# Patient Record
Sex: FEMALE | Race: WHITE | NOT HISPANIC OR LATINO
[De-identification: names, ages, dates, MRNs, and addresses within clinical notes are randomized per-mention and may not be internally consistent; named-entity substitution may affect disease eponyms.]

---

## 2017-02-24 ENCOUNTER — FORM ENCOUNTER (OUTPATIENT)
Age: 57
End: 2017-02-24

## 2017-02-24 ENCOUNTER — APPOINTMENT (OUTPATIENT)
Dept: INTERNAL MEDICINE | Facility: CLINIC | Age: 57
End: 2017-02-24

## 2017-02-24 ENCOUNTER — NON-APPOINTMENT (OUTPATIENT)
Age: 57
End: 2017-02-24

## 2017-02-24 VITALS — SYSTOLIC BLOOD PRESSURE: 120 MMHG | DIASTOLIC BLOOD PRESSURE: 70 MMHG

## 2017-02-24 VITALS — HEIGHT: 64.75 IN | BODY MASS INDEX: 27.66 KG/M2 | WEIGHT: 164 LBS

## 2017-02-24 LAB
BILIRUB UR QL STRIP: NORMAL
CLARITY UR: CLEAR
COLLECTION METHOD: NORMAL
GLUCOSE UR-MCNC: NORMAL
HCG UR QL: 0.2 EU/DL
HGB UR QL STRIP.AUTO: NORMAL
KETONES UR-MCNC: NORMAL
LEUKOCYTE ESTERASE UR QL STRIP: NORMAL
NITRITE UR QL STRIP: NORMAL
PH UR STRIP: 5.5
PROT UR STRIP-MCNC: NORMAL
SP GR UR STRIP: 1.02

## 2017-02-25 ENCOUNTER — OUTPATIENT (OUTPATIENT)
Dept: OUTPATIENT SERVICES | Facility: HOSPITAL | Age: 57
LOS: 1 days | End: 2017-02-25
Payer: COMMERCIAL

## 2017-02-25 ENCOUNTER — APPOINTMENT (OUTPATIENT)
Dept: RADIOLOGY | Facility: IMAGING CENTER | Age: 57
End: 2017-02-25

## 2017-02-25 DIAGNOSIS — Z00.8 ENCOUNTER FOR OTHER GENERAL EXAMINATION: ICD-10-CM

## 2017-02-25 PROCEDURE — 71046 X-RAY EXAM CHEST 2 VIEWS: CPT

## 2017-02-27 LAB — BACTERIA UR CULT: ABNORMAL

## 2017-03-03 ENCOUNTER — LABORATORY RESULT (OUTPATIENT)
Age: 57
End: 2017-03-03

## 2017-03-03 ENCOUNTER — APPOINTMENT (OUTPATIENT)
Dept: INTERNAL MEDICINE | Facility: CLINIC | Age: 57
End: 2017-03-03

## 2017-03-03 VITALS — TEMPERATURE: 97.6 F

## 2017-03-03 VITALS — SYSTOLIC BLOOD PRESSURE: 120 MMHG | DIASTOLIC BLOOD PRESSURE: 70 MMHG

## 2017-03-03 LAB
BILIRUB UR QL STRIP: NEGATIVE
CLARITY UR: CLEAR
COLLECTION METHOD: NORMAL
GLUCOSE UR-MCNC: NEGATIVE
HCG UR QL: 0.2 EU/DL
HGB UR QL STRIP.AUTO: NORMAL
KETONES UR-MCNC: NEGATIVE
LEUKOCYTE ESTERASE UR QL STRIP: NORMAL
NITRITE UR QL STRIP: NEGATIVE
PH UR STRIP: 8.5
PROT UR STRIP-MCNC: NEGATIVE
SP GR UR STRIP: 1.02

## 2017-03-04 LAB
ALBUMIN SERPL ELPH-MCNC: 4 G/DL
ALP BLD-CCNC: 84 U/L
ALT SERPL-CCNC: 9 U/L
ANION GAP SERPL CALC-SCNC: 16 MMOL/L
AST SERPL-CCNC: 20 U/L
BASOPHILS # BLD AUTO: 0.02 K/UL
BASOPHILS NFR BLD AUTO: 0.3 %
BILIRUB SERPL-MCNC: 0.2 MG/DL
BUN SERPL-MCNC: 13 MG/DL
CALCIUM SERPL-MCNC: 9.8 MG/DL
CHLORIDE SERPL-SCNC: 104 MMOL/L
CHOLEST SERPL-MCNC: 166 MG/DL
CHOLEST/HDLC SERPL: 2.4 RATIO
CO2 SERPL-SCNC: 22 MMOL/L
CREAT SERPL-MCNC: 0.63 MG/DL
EOSINOPHIL # BLD AUTO: 0.11 K/UL
EOSINOPHIL NFR BLD AUTO: 1.7 %
GLUCOSE SERPL-MCNC: 82 MG/DL
HCT VFR BLD CALC: 39 %
HDLC SERPL-MCNC: 68 MG/DL
HGB BLD-MCNC: 12.6 G/DL
IMM GRANULOCYTES NFR BLD AUTO: 0.2 %
LDLC SERPL CALC-MCNC: 88 MG/DL
LYMPHOCYTES # BLD AUTO: 2.44 K/UL
LYMPHOCYTES NFR BLD AUTO: 37.8 %
MAN DIFF?: NORMAL
MCHC RBC-ENTMCNC: 31.3 PG
MCHC RBC-ENTMCNC: 32.3 GM/DL
MCV RBC AUTO: 97 FL
MONOCYTES # BLD AUTO: 0.46 K/UL
MONOCYTES NFR BLD AUTO: 7.1 %
NEUTROPHILS # BLD AUTO: 3.42 K/UL
NEUTROPHILS NFR BLD AUTO: 52.9 %
PLATELET # BLD AUTO: 282 K/UL
POTASSIUM SERPL-SCNC: 4.8 MMOL/L
PROT SERPL-MCNC: 6.9 G/DL
RBC # BLD: 4.02 M/UL
RBC # FLD: 13.9 %
SODIUM SERPL-SCNC: 142 MMOL/L
TRIGL SERPL-MCNC: 52 MG/DL
WBC # FLD AUTO: 6.46 K/UL

## 2017-03-23 ENCOUNTER — APPOINTMENT (OUTPATIENT)
Dept: UROLOGY | Facility: CLINIC | Age: 57
End: 2017-03-23

## 2017-03-23 VITALS
HEART RATE: 57 BPM | TEMPERATURE: 97.7 F | BODY MASS INDEX: 27.49 KG/M2 | HEIGHT: 65 IN | DIASTOLIC BLOOD PRESSURE: 79 MMHG | SYSTOLIC BLOOD PRESSURE: 121 MMHG | RESPIRATION RATE: 16 BRPM | WEIGHT: 165 LBS

## 2017-03-23 DIAGNOSIS — F17.200 NICOTINE DEPENDENCE, UNSPECIFIED, UNCOMPLICATED: ICD-10-CM

## 2017-03-24 ENCOUNTER — TRANSCRIPTION ENCOUNTER (OUTPATIENT)
Age: 57
End: 2017-03-24

## 2017-03-26 LAB
APPEARANCE: CLEAR
BACTERIA UR CULT: NORMAL
BACTERIA: ABNORMAL
BILIRUBIN URINE: NEGATIVE
BLOOD URINE: NEGATIVE
COLOR: YELLOW
CORE LAB FLUID CYTOLOGY: NORMAL
GLUCOSE QUALITATIVE U: NORMAL MG/DL
HYALINE CASTS: 1 /LPF
KETONES URINE: NEGATIVE
LEUKOCYTE ESTERASE URINE: ABNORMAL
MICROSCOPIC-UA: NORMAL
NITRITE URINE: NEGATIVE
PH URINE: 7
PROTEIN URINE: NEGATIVE MG/DL
RED BLOOD CELLS URINE: 3 /HPF
SPECIFIC GRAVITY URINE: 1.02
SQUAMOUS EPITHELIAL CELLS: 5 /HPF
UROBILINOGEN URINE: NORMAL MG/DL
WHITE BLOOD CELLS URINE: 3 /HPF

## 2017-04-12 ENCOUNTER — FORM ENCOUNTER (OUTPATIENT)
Age: 57
End: 2017-04-12

## 2017-04-13 ENCOUNTER — APPOINTMENT (OUTPATIENT)
Dept: UROLOGY | Facility: CLINIC | Age: 57
End: 2017-04-13

## 2017-04-13 ENCOUNTER — OUTPATIENT (OUTPATIENT)
Dept: OUTPATIENT SERVICES | Facility: HOSPITAL | Age: 57
LOS: 1 days | End: 2017-04-13
Payer: COMMERCIAL

## 2017-04-13 ENCOUNTER — APPOINTMENT (OUTPATIENT)
Dept: CT IMAGING | Facility: IMAGING CENTER | Age: 57
End: 2017-04-13

## 2017-04-13 VITALS
RESPIRATION RATE: 15 BRPM | HEART RATE: 69 BPM | TEMPERATURE: 97.5 F | BODY MASS INDEX: 27.49 KG/M2 | DIASTOLIC BLOOD PRESSURE: 81 MMHG | WEIGHT: 165 LBS | HEIGHT: 65 IN | SYSTOLIC BLOOD PRESSURE: 117 MMHG

## 2017-04-13 DIAGNOSIS — R35.0 FREQUENCY OF MICTURITION: ICD-10-CM

## 2017-04-13 DIAGNOSIS — R31.29 OTHER MICROSCOPIC HEMATURIA: ICD-10-CM

## 2017-04-13 PROCEDURE — 52000 CYSTOURETHROSCOPY: CPT

## 2017-04-13 PROCEDURE — 88112 CYTOPATH CELL ENHANCE TECH: CPT | Mod: 26

## 2017-04-13 PROCEDURE — 74178 CT ABD&PLV WO CNTR FLWD CNTR: CPT

## 2017-04-15 LAB
APPEARANCE: CLEAR
BACTERIA UR CULT: NORMAL
BACTERIA: NEGATIVE
BILIRUBIN URINE: NEGATIVE
BLOOD URINE: NEGATIVE
COLOR: YELLOW
GLUCOSE QUALITATIVE U: NORMAL MG/DL
KETONES URINE: NEGATIVE
LEUKOCYTE ESTERASE URINE: NEGATIVE
MICROSCOPIC-UA: NORMAL
NITRITE URINE: NEGATIVE
PH URINE: 6
PROTEIN URINE: NEGATIVE MG/DL
RED BLOOD CELLS URINE: 2 /HPF
SPECIFIC GRAVITY URINE: 1.03
SQUAMOUS EPITHELIAL CELLS: 2 /HPF
UROBILINOGEN URINE: NORMAL MG/DL
WHITE BLOOD CELLS URINE: 2 /HPF

## 2017-04-17 DIAGNOSIS — J32.9 CHRONIC SINUSITIS, UNSPECIFIED: ICD-10-CM

## 2017-04-17 DIAGNOSIS — R31.29 OTHER MICROSCOPIC HEMATURIA: ICD-10-CM

## 2017-04-18 LAB — CORE LAB FLUID CYTOLOGY: NORMAL

## 2017-06-04 ENCOUNTER — TRANSCRIPTION ENCOUNTER (OUTPATIENT)
Age: 57
End: 2017-06-04

## 2017-08-31 ENCOUNTER — TRANSCRIPTION ENCOUNTER (OUTPATIENT)
Age: 57
End: 2017-08-31

## 2017-09-11 ENCOUNTER — APPOINTMENT (OUTPATIENT)
Dept: INTERNAL MEDICINE | Facility: CLINIC | Age: 57
End: 2017-09-11
Payer: COMMERCIAL

## 2017-09-11 VITALS — WEIGHT: 163 LBS | HEIGHT: 65 IN | TEMPERATURE: 98.5 F | BODY MASS INDEX: 27.16 KG/M2

## 2017-09-11 VITALS — DIASTOLIC BLOOD PRESSURE: 70 MMHG | SYSTOLIC BLOOD PRESSURE: 120 MMHG

## 2017-09-11 PROCEDURE — 99214 OFFICE O/P EST MOD 30 MIN: CPT

## 2017-09-11 RX ORDER — FAMOTIDINE 40 MG/1
40 TABLET, FILM COATED ORAL
Qty: 90 | Refills: 0 | Status: DISCONTINUED | COMMUNITY
Start: 2017-08-30

## 2017-09-11 RX ORDER — CEFUROXIME AXETIL 250 MG/1
250 TABLET ORAL
Qty: 20 | Refills: 0 | Status: DISCONTINUED | COMMUNITY
Start: 2017-04-03

## 2017-09-11 RX ORDER — DICLOFENAC SODIUM 75 MG/1
75 TABLET, DELAYED RELEASE ORAL
Qty: 10 | Refills: 0 | Status: DISCONTINUED | COMMUNITY
Start: 2017-06-04

## 2017-09-11 RX ORDER — CYCLOBENZAPRINE HYDROCHLORIDE 5 MG/1
5 TABLET, FILM COATED ORAL
Qty: 15 | Refills: 0 | Status: DISCONTINUED | COMMUNITY
Start: 2017-06-04

## 2017-09-11 RX ORDER — AZITHROMYCIN 250 MG/1
250 TABLET, FILM COATED ORAL
Qty: 6 | Refills: 0 | Status: DISCONTINUED | COMMUNITY
Start: 2017-08-09

## 2017-09-20 ENCOUNTER — MEDICATION RENEWAL (OUTPATIENT)
Age: 57
End: 2017-09-20

## 2017-10-10 ENCOUNTER — APPOINTMENT (OUTPATIENT)
Dept: UROLOGY | Facility: CLINIC | Age: 57
End: 2017-10-10
Payer: COMMERCIAL

## 2017-10-10 PROCEDURE — 99214 OFFICE O/P EST MOD 30 MIN: CPT

## 2017-10-12 ENCOUNTER — APPOINTMENT (OUTPATIENT)
Dept: UROLOGY | Facility: CLINIC | Age: 57
End: 2017-10-12

## 2017-10-14 LAB
APPEARANCE: ABNORMAL
BACTERIA UR CULT: NORMAL
BACTERIA: ABNORMAL
BILIRUBIN URINE: NEGATIVE
BLOOD URINE: NEGATIVE
COLOR: YELLOW
CORE LAB FLUID CYTOLOGY: NORMAL
GLUCOSE QUALITATIVE U: NEGATIVE MG/DL
HYALINE CASTS: 1 /LPF
KETONES URINE: NEGATIVE
LEUKOCYTE ESTERASE URINE: ABNORMAL
MICROSCOPIC-UA: NORMAL
NITRITE URINE: NEGATIVE
PH URINE: 5.5
PROTEIN URINE: ABNORMAL MG/DL
RED BLOOD CELLS URINE: 2 /HPF
SPECIFIC GRAVITY URINE: 1.03
SQUAMOUS EPITHELIAL CELLS: 12 /HPF
UROBILINOGEN URINE: NEGATIVE MG/DL
WHITE BLOOD CELLS URINE: 7 /HPF

## 2017-10-15 ENCOUNTER — TRANSCRIPTION ENCOUNTER (OUTPATIENT)
Age: 57
End: 2017-10-15

## 2017-10-29 ENCOUNTER — TRANSCRIPTION ENCOUNTER (OUTPATIENT)
Age: 57
End: 2017-10-29

## 2017-10-31 ENCOUNTER — APPOINTMENT (OUTPATIENT)
Dept: INTERNAL MEDICINE | Facility: CLINIC | Age: 57
End: 2017-10-31
Payer: COMMERCIAL

## 2017-10-31 PROCEDURE — 99213 OFFICE O/P EST LOW 20 MIN: CPT

## 2017-10-31 RX ORDER — DOXYCYCLINE HYCLATE 100 MG/1
100 CAPSULE ORAL TWICE DAILY
Qty: 14 | Refills: 0 | Status: DISCONTINUED | COMMUNITY
Start: 2017-03-03 | End: 2017-10-31

## 2017-10-31 RX ORDER — DOXYCYCLINE 100 MG/1
100 CAPSULE ORAL TWICE DAILY
Qty: 14 | Refills: 0 | Status: DISCONTINUED | COMMUNITY
Start: 2017-09-20 | End: 2017-10-31

## 2017-10-31 RX ORDER — METRONIDAZOLE 10 MG/G
1 GEL TOPICAL
Qty: 60 | Refills: 0 | Status: DISCONTINUED | COMMUNITY
Start: 2016-12-23 | End: 2017-10-31

## 2017-11-08 ENCOUNTER — TRANSCRIPTION ENCOUNTER (OUTPATIENT)
Age: 57
End: 2017-11-08

## 2017-11-20 ENCOUNTER — RX RENEWAL (OUTPATIENT)
Age: 57
End: 2017-11-20

## 2018-01-21 ENCOUNTER — TRANSCRIPTION ENCOUNTER (OUTPATIENT)
Age: 58
End: 2018-01-21

## 2018-01-31 ENCOUNTER — TRANSCRIPTION ENCOUNTER (OUTPATIENT)
Age: 58
End: 2018-01-31

## 2018-02-09 ENCOUNTER — APPOINTMENT (OUTPATIENT)
Dept: INTERNAL MEDICINE | Facility: CLINIC | Age: 58
End: 2018-02-09
Payer: COMMERCIAL

## 2018-02-09 ENCOUNTER — LABORATORY RESULT (OUTPATIENT)
Age: 58
End: 2018-02-09

## 2018-02-09 PROCEDURE — 36415 COLL VENOUS BLD VENIPUNCTURE: CPT

## 2018-02-10 LAB
25(OH)D3 SERPL-MCNC: 20.5 NG/ML
ALBUMIN SERPL ELPH-MCNC: 4.4 G/DL
ALP BLD-CCNC: 88 U/L
ALT SERPL-CCNC: 8 U/L
ANION GAP SERPL CALC-SCNC: 18 MMOL/L
AST SERPL-CCNC: 16 U/L
BASOPHILS # BLD AUTO: 0.02 K/UL
BASOPHILS NFR BLD AUTO: 0.2 %
BILIRUB SERPL-MCNC: 0.3 MG/DL
BUN SERPL-MCNC: 12 MG/DL
CALCIUM SERPL-MCNC: 10.5 MG/DL
CHLORIDE SERPL-SCNC: 102 MMOL/L
CHOLEST SERPL-MCNC: 217 MG/DL
CHOLEST/HDLC SERPL: 3.2 RATIO
CMV IGG SERPL QL: 6.8 U/ML
CMV IGG SERPL-IMP: POSITIVE
CO2 SERPL-SCNC: 23 MMOL/L
CREAT SERPL-MCNC: 0.71 MG/DL
EOSINOPHIL # BLD AUTO: 0.06 K/UL
EOSINOPHIL NFR BLD AUTO: 0.7 %
GLUCOSE SERPL-MCNC: 79 MG/DL
HBA1C MFR BLD HPLC: 5.6 %
HCT VFR BLD CALC: 37.7 %
HDLC SERPL-MCNC: 68 MG/DL
HETEROPH AB SER QL: NEGATIVE
HGB BLD-MCNC: 12.3 G/DL
IMM GRANULOCYTES NFR BLD AUTO: 0.2 %
LDLC SERPL CALC-MCNC: 139 MG/DL
LYMPHOCYTES # BLD AUTO: 2.5 K/UL
LYMPHOCYTES NFR BLD AUTO: 30.5 %
MAN DIFF?: NORMAL
MCHC RBC-ENTMCNC: 31.3 PG
MCHC RBC-ENTMCNC: 32.6 GM/DL
MCV RBC AUTO: 95.9 FL
MONOCYTES # BLD AUTO: 0.57 K/UL
MONOCYTES NFR BLD AUTO: 6.9 %
NEUTROPHILS # BLD AUTO: 5.04 K/UL
NEUTROPHILS NFR BLD AUTO: 61.5 %
PLATELET # BLD AUTO: 407 K/UL
POTASSIUM SERPL-SCNC: 4.8 MMOL/L
PROT SERPL-MCNC: 7.6 G/DL
RBC # BLD: 3.93 M/UL
RBC # FLD: 14.3 %
SAVE SPECIMEN: NORMAL
SODIUM SERPL-SCNC: 143 MMOL/L
T3RU NFR SERPL: 1.02 INDEX
T4 SERPL-MCNC: 7.6 UG/DL
TRIGL SERPL-MCNC: 49 MG/DL
TSH SERPL-ACNC: 1.63 UIU/ML
URATE SERPL-MCNC: 2.6 MG/DL
VIT B12 SERPL-MCNC: 933 PG/ML
WBC # FLD AUTO: 8.21 K/UL

## 2018-02-15 LAB
B19V IGG SER QL IA: 8.1 INDEX
B19V IGG+IGM SER-IMP: NORMAL
B19V IGG+IGM SER-IMP: POSITIVE
B19V IGM FLD-ACNC: 0.3 INDEX
B19V IGM SER-ACNC: NEGATIVE

## 2018-02-26 ENCOUNTER — APPOINTMENT (OUTPATIENT)
Dept: INTERNAL MEDICINE | Facility: CLINIC | Age: 58
End: 2018-02-26
Payer: COMMERCIAL

## 2018-02-26 ENCOUNTER — NON-APPOINTMENT (OUTPATIENT)
Age: 58
End: 2018-02-26

## 2018-02-26 VITALS — BODY MASS INDEX: 27.97 KG/M2 | HEIGHT: 66 IN | WEIGHT: 174 LBS

## 2018-02-26 VITALS — DIASTOLIC BLOOD PRESSURE: 70 MMHG | SYSTOLIC BLOOD PRESSURE: 120 MMHG

## 2018-02-26 LAB
BILIRUB UR QL STRIP: NORMAL
CLARITY UR: CLEAR
COLLECTION METHOD: NORMAL
GLUCOSE UR-MCNC: NORMAL
HCG UR QL: 0.2 EU/DL
HGB UR QL STRIP.AUTO: NORMAL
KETONES UR-MCNC: NORMAL
LEUKOCYTE ESTERASE UR QL STRIP: NORMAL
NITRITE UR QL STRIP: NORMAL
PH UR STRIP: 7.5
PROT UR STRIP-MCNC: NORMAL
SP GR UR STRIP: 1.02

## 2018-02-26 PROCEDURE — 93000 ELECTROCARDIOGRAM COMPLETE: CPT

## 2018-02-26 PROCEDURE — 99396 PREV VISIT EST AGE 40-64: CPT | Mod: 25

## 2018-02-26 PROCEDURE — 81003 URINALYSIS AUTO W/O SCOPE: CPT | Mod: QW

## 2018-02-26 PROCEDURE — 36415 COLL VENOUS BLD VENIPUNCTURE: CPT

## 2018-02-28 ENCOUNTER — FORM ENCOUNTER (OUTPATIENT)
Age: 58
End: 2018-02-28

## 2018-03-01 ENCOUNTER — OUTPATIENT (OUTPATIENT)
Dept: OUTPATIENT SERVICES | Facility: HOSPITAL | Age: 58
LOS: 1 days | End: 2018-03-01
Payer: COMMERCIAL

## 2018-03-01 ENCOUNTER — APPOINTMENT (OUTPATIENT)
Dept: RADIOLOGY | Facility: IMAGING CENTER | Age: 58
End: 2018-03-01
Payer: COMMERCIAL

## 2018-03-01 DIAGNOSIS — J40 BRONCHITIS, NOT SPECIFIED AS ACUTE OR CHRONIC: ICD-10-CM

## 2018-03-01 PROCEDURE — 71046 X-RAY EXAM CHEST 2 VIEWS: CPT | Mod: 26

## 2018-03-01 PROCEDURE — 71046 X-RAY EXAM CHEST 2 VIEWS: CPT

## 2018-03-02 ENCOUNTER — TRANSCRIPTION ENCOUNTER (OUTPATIENT)
Age: 58
End: 2018-03-02

## 2018-03-28 ENCOUNTER — RX RENEWAL (OUTPATIENT)
Age: 58
End: 2018-03-28

## 2018-04-09 ENCOUNTER — APPOINTMENT (OUTPATIENT)
Dept: UROLOGY | Facility: CLINIC | Age: 58
End: 2018-04-09

## 2018-04-17 ENCOUNTER — APPOINTMENT (OUTPATIENT)
Dept: UROLOGY | Facility: CLINIC | Age: 58
End: 2018-04-17

## 2018-04-19 ENCOUNTER — APPOINTMENT (OUTPATIENT)
Dept: INTERNAL MEDICINE | Facility: CLINIC | Age: 58
End: 2018-04-19

## 2018-04-26 ENCOUNTER — APPOINTMENT (OUTPATIENT)
Dept: INTERNAL MEDICINE | Facility: CLINIC | Age: 58
End: 2018-04-26
Payer: COMMERCIAL

## 2018-04-26 PROCEDURE — 77080 DXA BONE DENSITY AXIAL: CPT

## 2018-05-17 ENCOUNTER — APPOINTMENT (OUTPATIENT)
Dept: INTERNAL MEDICINE | Facility: CLINIC | Age: 58
End: 2018-05-17
Payer: COMMERCIAL

## 2018-05-17 VITALS — WEIGHT: 168 LBS | BODY MASS INDEX: 27.99 KG/M2 | HEIGHT: 65 IN

## 2018-05-17 VITALS — SYSTOLIC BLOOD PRESSURE: 120 MMHG | DIASTOLIC BLOOD PRESSURE: 70 MMHG

## 2018-05-17 PROCEDURE — 99214 OFFICE O/P EST MOD 30 MIN: CPT

## 2018-05-17 RX ORDER — AMOXICILLIN AND CLAVULANATE POTASSIUM 875; 125 MG/1; MG/1
875-125 TABLET, COATED ORAL
Qty: 20 | Refills: 0 | Status: DISCONTINUED | COMMUNITY
Start: 2018-03-04

## 2018-05-17 RX ORDER — ALBUTEROL SULFATE 90 UG/1
108 (90 BASE) AEROSOL, METERED RESPIRATORY (INHALATION)
Qty: 8 | Refills: 0 | Status: DISCONTINUED | COMMUNITY
Start: 2018-01-31

## 2018-05-17 RX ORDER — CLINDAMYCIN HYDROCHLORIDE 300 MG/1
300 CAPSULE ORAL
Qty: 18 | Refills: 0 | Status: DISCONTINUED | COMMUNITY
Start: 2018-02-10

## 2018-05-17 RX ORDER — DICLOFENAC SODIUM 50 MG/1
50 TABLET, DELAYED RELEASE ORAL
Qty: 60 | Refills: 0 | Status: DISCONTINUED | COMMUNITY
Start: 2017-12-22

## 2018-05-17 RX ORDER — NYSTATIN 100000 1/G
100000 POWDER TOPICAL
Qty: 60 | Refills: 0 | Status: DISCONTINUED | COMMUNITY
Start: 2018-03-30

## 2018-05-17 NOTE — ASSESSMENT
[FreeTextEntry1] : Her physical examination was negative my impression is this most likely is either of seasonal allergies or a mild viral syndrome. She was treated symptomatically

## 2018-05-17 NOTE — HISTORY OF PRESENT ILLNESS
[de-identified] : This is a patient with a history of a mood disorder was complaining of fatigue, mild pharyngitis mild rhinitis. She denies any fever chill

## 2018-06-14 ENCOUNTER — APPOINTMENT (OUTPATIENT)
Dept: INTERNAL MEDICINE | Facility: CLINIC | Age: 58
End: 2018-06-14
Payer: COMMERCIAL

## 2018-06-14 VITALS — DIASTOLIC BLOOD PRESSURE: 70 MMHG | SYSTOLIC BLOOD PRESSURE: 120 MMHG

## 2018-06-14 VITALS — HEIGHT: 65 IN | BODY MASS INDEX: 27.16 KG/M2 | WEIGHT: 163 LBS

## 2018-06-14 LAB — S PYO AG SPEC QL IA: NEGATIVE

## 2018-06-14 PROCEDURE — 99214 OFFICE O/P EST MOD 30 MIN: CPT | Mod: 25

## 2018-06-14 PROCEDURE — 87880 STREP A ASSAY W/OPTIC: CPT | Mod: QW

## 2018-06-14 NOTE — ASSESSMENT
[FreeTextEntry1] : Her physical examination was positive for multivitamins. Strep screen was negative. I started her on ranitidine 150 mg at night and referred her to ENT for laryngoscopy

## 2018-06-14 NOTE — HISTORY OF PRESENT ILLNESS
[de-identified] : This is a patient with a history of a mood disorder, seasonal allergies, who is complaining of feeling a lump in her throat that is worse in the morning and gets better in the afternoon. She denies feeling any sourness in her mouth or sour taste.

## 2018-12-09 ENCOUNTER — TRANSCRIPTION ENCOUNTER (OUTPATIENT)
Age: 58
End: 2018-12-09

## 2018-12-11 ENCOUNTER — APPOINTMENT (OUTPATIENT)
Dept: INTERNAL MEDICINE | Facility: CLINIC | Age: 58
End: 2018-12-11
Payer: COMMERCIAL

## 2018-12-11 VITALS — HEIGHT: 65 IN | BODY MASS INDEX: 28.32 KG/M2 | WEIGHT: 170 LBS

## 2018-12-11 VITALS — DIASTOLIC BLOOD PRESSURE: 70 MMHG | SYSTOLIC BLOOD PRESSURE: 120 MMHG

## 2018-12-11 VITALS — TEMPERATURE: 97.8 F

## 2018-12-11 PROCEDURE — 99213 OFFICE O/P EST LOW 20 MIN: CPT

## 2018-12-11 RX ORDER — SERTRALINE HYDROCHLORIDE 50 MG/1
50 TABLET, FILM COATED ORAL DAILY
Qty: 45 | Refills: 3 | Status: DISCONTINUED | COMMUNITY
Start: 2017-05-31 | End: 2018-12-11

## 2018-12-11 NOTE — HISTORY OF PRESENT ILLNESS
[de-identified] : This is a 58-year-old patient complaining of nasal discharge with greenish secretions and also multivitamins. She denies any fever or any chills.

## 2018-12-11 NOTE — ASSESSMENT
[FreeTextEntry1] : The patient's physical examination was positive for a severely coated tongue and a sinusitis. She was treated with amoxicillin 250 mg q.i.d. for a week and Mycelex troches one q.i.d. in addition the patient informs me that she stopped her Zoloft 50 mg

## 2018-12-11 NOTE — REVIEW OF SYSTEMS
[Nasal Discharge] : nasal discharge [Sore Throat] : sore throat [Cough] : cough [Negative] : Genitourinary

## 2018-12-14 LAB — BACTERIA NOSE AEROBE CULT: NORMAL

## 2019-02-28 ENCOUNTER — NON-APPOINTMENT (OUTPATIENT)
Age: 59
End: 2019-02-28

## 2019-02-28 ENCOUNTER — LABORATORY RESULT (OUTPATIENT)
Age: 59
End: 2019-02-28

## 2019-02-28 ENCOUNTER — APPOINTMENT (OUTPATIENT)
Dept: INTERNAL MEDICINE | Facility: CLINIC | Age: 59
End: 2019-02-28
Payer: COMMERCIAL

## 2019-02-28 VITALS — DIASTOLIC BLOOD PRESSURE: 70 MMHG | SYSTOLIC BLOOD PRESSURE: 120 MMHG

## 2019-02-28 VITALS — HEIGHT: 65 IN | BODY MASS INDEX: 29.99 KG/M2 | WEIGHT: 180 LBS

## 2019-02-28 LAB
BILIRUB UR QL STRIP: NORMAL
CLARITY UR: CLEAR
COLLECTION METHOD: NORMAL
GLUCOSE UR-MCNC: NORMAL
HCG UR QL: 0.2 EU/DL
HGB UR QL STRIP.AUTO: NORMAL
KETONES UR-MCNC: NORMAL
LEUKOCYTE ESTERASE UR QL STRIP: NORMAL
NITRITE UR QL STRIP: NORMAL
PH UR STRIP: 5
PROT UR STRIP-MCNC: NORMAL
SP GR UR STRIP: 1.02

## 2019-02-28 PROCEDURE — 81003 URINALYSIS AUTO W/O SCOPE: CPT | Mod: QW

## 2019-02-28 PROCEDURE — 93000 ELECTROCARDIOGRAM COMPLETE: CPT

## 2019-02-28 PROCEDURE — 36415 COLL VENOUS BLD VENIPUNCTURE: CPT

## 2019-02-28 PROCEDURE — 99396 PREV VISIT EST AGE 40-64: CPT | Mod: 25

## 2019-02-28 RX ORDER — AMOXICILLIN 250 MG/1
250 CAPSULE ORAL
Qty: 28 | Refills: 0 | Status: DISCONTINUED | COMMUNITY
Start: 2018-12-11 | End: 2019-02-28

## 2019-02-28 RX ORDER — AMOXICILLIN 500 MG/1
500 CAPSULE ORAL
Qty: 21 | Refills: 0 | Status: DISCONTINUED | COMMUNITY
Start: 2017-10-30 | End: 2019-02-28

## 2019-02-28 RX ORDER — CLOTRIMAZOLE 10 MG/1
10 LOZENGE ORAL 4 TIMES DAILY
Qty: 28 | Refills: 2 | Status: DISCONTINUED | COMMUNITY
Start: 2018-12-11 | End: 2019-02-28

## 2019-02-28 NOTE — HISTORY OF PRESENT ILLNESS
[de-identified] : This a 58-year-old woman who's here today for an annual examination. Of concern is that she continues to smoke a half a pack of cigarettes a day. She is also due for her mammogram and colonoscopy. She continues to have recurrent sinusitis

## 2019-02-28 NOTE — PHYSICAL EXAM
[Well Nourished] : well nourished [PERRL] : pupils equal round and reactive to light [EOMI] : extraocular movements intact [Supple] : supple [No Lymphadenopathy] : no lymphadenopathy [No Respiratory Distress] : no respiratory distress  [Clear to Auscultation] : lungs were clear to auscultation bilaterally [No Accessory Muscle Use] : no accessory muscle use [Regular Rhythm] : with a regular rhythm [Normal S1, S2] : normal S1 and S2 [No Carotid Bruits] : no carotid bruits [Pedal Pulses Present] : the pedal pulses are present [Soft] : abdomen soft [No HSM] : no HSM [Declined Rectal Exam] : declined rectal exam [Normal Affect] : the affect was normal

## 2019-02-28 NOTE — HEALTH RISK ASSESSMENT
[] : No [No falls in past year] : Patient reported no falls in the past year [0] : 2) Feeling down, depressed, or hopeless: Not at all (0) [[id number]] : 0 [Patient reported colonoscopy was normal] : Patient reported colonoscopy was normal [Fully functional (bathing, dressing, toileting, transferring, walking, feeding)] : Fully functional (bathing, dressing, toileting, transferring, walking, feeding) [Fully functional (using the telephone, shopping, preparing meals, housekeeping, doing laundry, using] : Fully functional and needs no help or supervision to perform IADLs (using the telephone, shopping, preparing meals, housekeeping, doing laundry, using transportation, managing medications and managing finances) [ColonoscopyDate] : 2014 [Reviewed updated] : Reviewed updated [I will adhere to the patient's wishes as expressed in the advance directive except as noted below.] : I will adhere to the patient's wishes as expressed in the advance directive except as noted below [AdvancecareDate] : 2/28/19

## 2019-02-28 NOTE — HEALTH RISK ASSESSMENT
[] : No [No falls in past year] : Patient reported no falls in the past year [0] : 2) Feeling down, depressed, or hopeless: Not at all (0) [[id number]] : 0 [Patient reported colonoscopy was normal] : Patient reported colonoscopy was normal [Fully functional (bathing, dressing, toileting, transferring, walking, feeding)] : Fully functional (bathing, dressing, toileting, transferring, walking, feeding) [Fully functional (using the telephone, shopping, preparing meals, housekeeping, doing laundry, using] : Fully functional and needs no help or supervision to perform IADLs (using the telephone, shopping, preparing meals, housekeeping, doing laundry, using transportation, managing medications and managing finances) [ColonoscopyDate] : 2014 [Reviewed updated] : Reviewed updated [I will adhere to the patient's wishes as expressed in the advance directive except as noted below.] : I will adhere to the patient's wishes as expressed in the advance directive except as noted below [AdvancecareDate] : 2/28/19

## 2019-02-28 NOTE — ASSESSMENT
[FreeTextEntry1] : This is a 58-year-old woman with a history of smoking, recurrent sinusitis who is here today for her annual examination. Her vital signs are stable her physical examination is normal aside from being overweight. Her EKG reveals no acute changes. I scheduled the patient for a screening CT of the chest. Also she is due for a colonoscopy mammogram and GYN exam

## 2019-02-28 NOTE — HISTORY OF PRESENT ILLNESS
[de-identified] : This a 58-year-old woman who's here today for an annual examination. Of concern is that she continues to smoke a half a pack of cigarettes a day. She is also due for her mammogram and colonoscopy. She continues to have recurrent sinusitis

## 2019-03-02 LAB
25(OH)D3 SERPL-MCNC: 20.3 NG/ML
ALBUMIN SERPL ELPH-MCNC: 4.5 G/DL
ALP BLD-CCNC: 95 U/L
ALT SERPL-CCNC: 10 U/L
ANION GAP SERPL CALC-SCNC: 13 MMOL/L
APPEARANCE: CLEAR
AST SERPL-CCNC: 19 U/L
BACTERIA: NEGATIVE
BASOPHILS # BLD AUTO: 0.04 K/UL
BASOPHILS NFR BLD AUTO: 0.5 %
BILIRUB SERPL-MCNC: <0.2 MG/DL
BILIRUBIN URINE: NEGATIVE
BLOOD URINE: ABNORMAL
BUN SERPL-MCNC: 17 MG/DL
CALCIUM OXALATE CRYSTALS: ABNORMAL
CALCIUM SERPL-MCNC: 10 MG/DL
CHLORIDE SERPL-SCNC: 103 MMOL/L
CHOLEST SERPL-MCNC: 191 MG/DL
CHOLEST/HDLC SERPL: 3 RATIO
CO2 SERPL-SCNC: 25 MMOL/L
COLOR: YELLOW
CREAT SERPL-MCNC: 0.6 MG/DL
EOSINOPHIL # BLD AUTO: 0.16 K/UL
EOSINOPHIL NFR BLD AUTO: 2 %
FERRITIN SERPL-MCNC: 123 NG/ML
GLUCOSE QUALITATIVE U: NEGATIVE
GLUCOSE SERPL-MCNC: 75 MG/DL
HBA1C MFR BLD HPLC: 5.5 %
HCT VFR BLD CALC: 42.1 %
HDLC SERPL-MCNC: 64 MG/DL
HGB BLD-MCNC: 12.8 G/DL
HYALINE CASTS: 2 /LPF
IMM GRANULOCYTES NFR BLD AUTO: 0.2 %
KETONES URINE: NEGATIVE
LDLC SERPL CALC-MCNC: 98 MG/DL
LEUKOCYTE ESTERASE URINE: ABNORMAL
LYMPHOCYTES # BLD AUTO: 2.97 K/UL
LYMPHOCYTES NFR BLD AUTO: 36.6 %
MAN DIFF?: NORMAL
MCHC RBC-ENTMCNC: 30.4 GM/DL
MCHC RBC-ENTMCNC: 30.8 PG
MCV RBC AUTO: 101.4 FL
MICROSCOPIC-UA: NORMAL
MONOCYTES # BLD AUTO: 0.78 K/UL
MONOCYTES NFR BLD AUTO: 9.6 %
NEUTROPHILS # BLD AUTO: 4.14 K/UL
NEUTROPHILS NFR BLD AUTO: 51.1 %
NITRITE URINE: NEGATIVE
PH URINE: 5.5
PLATELET # BLD AUTO: 304 K/UL
POTASSIUM SERPL-SCNC: 4.5 MMOL/L
PROT SERPL-MCNC: 7.6 G/DL
PROTEIN URINE: NEGATIVE
RBC # BLD: 4.15 M/UL
RBC # FLD: 13.6 %
RED BLOOD CELLS URINE: 2 /HPF
SAVE SPECIMEN: NORMAL
SODIUM SERPL-SCNC: 141 MMOL/L
SPECIFIC GRAVITY URINE: 1.03
SQUAMOUS EPITHELIAL CELLS: 4 /HPF
T3RU NFR SERPL: 1 TBI
T4 SERPL-MCNC: 6.9 UG/DL
TRIGL SERPL-MCNC: 147 MG/DL
TSH SERPL-ACNC: 2.15 UIU/ML
URATE SERPL-MCNC: 2.2 MG/DL
UROBILINOGEN URINE: NORMAL
VIT B12 SERPL-MCNC: 559 PG/ML
WBC # FLD AUTO: 8.11 K/UL
WHITE BLOOD CELLS URINE: 5 /HPF

## 2019-03-15 ENCOUNTER — EMERGENCY (EMERGENCY)
Facility: HOSPITAL | Age: 59
LOS: 1 days | Discharge: HOME | End: 2019-03-15
Attending: EMERGENCY MEDICINE | Admitting: EMERGENCY MEDICINE
Payer: OTHER MISCELLANEOUS

## 2019-03-15 VITALS
HEART RATE: 75 BPM | TEMPERATURE: 98 F | DIASTOLIC BLOOD PRESSURE: 76 MMHG | SYSTOLIC BLOOD PRESSURE: 126 MMHG | OXYGEN SATURATION: 100 % | RESPIRATION RATE: 16 BRPM

## 2019-03-15 PROCEDURE — 99283 EMERGENCY DEPT VISIT LOW MDM: CPT

## 2019-03-15 RX ORDER — ONDANSETRON 8 MG/1
4 TABLET, FILM COATED ORAL ONCE
Qty: 0 | Refills: 0 | Status: COMPLETED | OUTPATIENT
Start: 2019-03-15 | End: 2019-03-15

## 2019-03-15 RX ORDER — LIDOCAINE 4 G/100G
1 CREAM TOPICAL ONCE
Qty: 0 | Refills: 0 | Status: COMPLETED | OUTPATIENT
Start: 2019-03-15 | End: 2019-03-15

## 2019-03-15 RX ORDER — IBUPROFEN 200 MG
600 TABLET ORAL ONCE
Qty: 0 | Refills: 0 | Status: COMPLETED | OUTPATIENT
Start: 2019-03-15 | End: 2019-03-15

## 2019-03-15 RX ORDER — TETANUS TOXOID, REDUCED DIPHTHERIA TOXOID AND ACELLULAR PERTUSSIS VACCINE, ADSORBED 5; 2.5; 8; 8; 2.5 [IU]/.5ML; [IU]/.5ML; UG/.5ML; UG/.5ML; UG/.5ML
0.5 SUSPENSION INTRAMUSCULAR ONCE
Qty: 0 | Refills: 0 | Status: COMPLETED | OUTPATIENT
Start: 2019-03-15 | End: 2019-03-15

## 2019-03-15 RX ADMIN — ONDANSETRON 4 MILLIGRAM(S): 8 TABLET, FILM COATED ORAL at 13:26

## 2019-03-15 RX ADMIN — Medication 600 MILLIGRAM(S): at 13:25

## 2019-03-15 RX ADMIN — TETANUS TOXOID, REDUCED DIPHTHERIA TOXOID AND ACELLULAR PERTUSSIS VACCINE, ADSORBED 0.5 MILLILITER(S): 5; 2.5; 8; 8; 2.5 SUSPENSION INTRAMUSCULAR at 13:30

## 2019-03-15 RX ADMIN — LIDOCAINE 1 PATCH: 4 CREAM TOPICAL at 13:25

## 2019-03-15 NOTE — ED PROVIDER NOTE - ATTENDING CONTRIBUTION TO CARE
59 year old with mech fall.  minor abrasion. will give adacel, pain control.  scanlon. doubt bony injury

## 2019-03-15 NOTE — ED ADULT TRIAGE NOTE - CHIEF COMPLAINT QUOTE
while at work, accidently fell out of chair and hit head "lightly" with nausea.  Denies any vomiting, No LOC, no blood thinner use.

## 2019-03-15 NOTE — ED PROVIDER NOTE - OBJECTIVE STATEMENT
60yo F w/ pmhx of allergies, s/p fall at work this am, c/o nausea, and left side upper back pain. Pt works as a , was seating and leaving over to pick something from the cabinet, when the chair tipped over, mild head injury, but no LOC, no blurry vision, no blood thinner use. Pt c/o minor abrasion to the right elbow, nausea, but no vomiting. Denies any other symptoms.

## 2019-03-15 NOTE — ED PROVIDER NOTE - CLINICAL SUMMARY MEDICAL DECISION MAKING FREE TEXT BOX
58yo F w/ pmhx of allergies, s/p fall at work this am, c/o nausea, and left side upper back pain. Pt works as a , was seating and leaving over to pick something from the cabinet, when the chair tipped over, mild head injury, but no LOC, no blurry vision, no blood thinner use. Pt c/o minor abrasion to the right elbow, nausea, but no vomiting. PE was unremarkable. Will give Tdap and discharge   -  reassurance, tdap

## 2019-03-28 ENCOUNTER — FORM ENCOUNTER (OUTPATIENT)
Age: 59
End: 2019-03-28

## 2019-03-29 ENCOUNTER — APPOINTMENT (OUTPATIENT)
Dept: CT IMAGING | Facility: IMAGING CENTER | Age: 59
End: 2019-03-29
Payer: COMMERCIAL

## 2019-03-29 ENCOUNTER — OUTPATIENT (OUTPATIENT)
Dept: OUTPATIENT SERVICES | Facility: HOSPITAL | Age: 59
LOS: 1 days | End: 2019-03-29
Payer: COMMERCIAL

## 2019-03-29 DIAGNOSIS — Z00.00 ENCOUNTER FOR GENERAL ADULT MEDICAL EXAMINATION WITHOUT ABNORMAL FINDINGS: ICD-10-CM

## 2019-03-29 PROCEDURE — G0297: CPT | Mod: 26

## 2019-03-29 PROCEDURE — G0297: CPT

## 2019-05-15 ENCOUNTER — RESULT REVIEW (OUTPATIENT)
Age: 59
End: 2019-05-15

## 2019-05-20 ENCOUNTER — APPOINTMENT (OUTPATIENT)
Dept: PULMONOLOGY | Facility: CLINIC | Age: 59
End: 2019-05-20
Payer: COMMERCIAL

## 2019-05-20 VITALS
OXYGEN SATURATION: 99 % | HEIGHT: 65 IN | WEIGHT: 171 LBS | HEART RATE: 58 BPM | RESPIRATION RATE: 15 BRPM | SYSTOLIC BLOOD PRESSURE: 92 MMHG | DIASTOLIC BLOOD PRESSURE: 60 MMHG | TEMPERATURE: 97.2 F | BODY MASS INDEX: 28.49 KG/M2

## 2019-05-20 VITALS — BODY MASS INDEX: 28.66 KG/M2 | WEIGHT: 172 LBS | HEIGHT: 65 IN

## 2019-05-20 PROCEDURE — 99203 OFFICE O/P NEW LOW 30 MIN: CPT | Mod: 25

## 2019-05-20 PROCEDURE — 94726 PLETHYSMOGRAPHY LUNG VOLUMES: CPT

## 2019-05-20 PROCEDURE — 94729 DIFFUSING CAPACITY: CPT

## 2019-05-20 PROCEDURE — 94060 EVALUATION OF WHEEZING: CPT

## 2019-05-20 PROCEDURE — ZZZZZ: CPT

## 2019-05-20 NOTE — HISTORY OF PRESENT ILLNESS
[FreeTextEntry1] : 59-year-old female without any respiratory complaints. However she hasn't least 15-20 pack year history of cigarette smoking and as a result underwent CAT scan imaging which demonstrated two 2 mm nodules. The patient reports that she stop smoking recently. She is using electronic cigarettes. She denies dyspnea, chest pain or coughing. There is a family history of tuberculosis. She denies any fevers or weight loss.

## 2019-05-20 NOTE — PHYSICAL EXAM
[General Appearance - In No Acute Distress] : no acute distress [Normal Conjunctiva] : the conjunctiva exhibited no abnormalities [Neck Appearance] : the appearance of the neck was normal [Heart Rate And Rhythm] : heart rate and rhythm were normal [] : no respiratory distress [Respiration, Rhythm And Depth] : normal respiratory rhythm and effort [Auscultation Breath Sounds / Voice Sounds] : lungs were clear to auscultation bilaterally [Abnormal Walk] : normal gait [Nail Clubbing] : no clubbing of the fingernails [Cyanosis, Localized] : no localized cyanosis [No Focal Deficits] : no focal deficits [FreeTextEntry1] : /6 systolic ejection murmur aortic area

## 2019-05-20 NOTE — ASSESSMENT
[FreeTextEntry1] : I reviewed the patient's CAT scan. There are two 2 mm nodules in the left upper lobe. I've encouraged the patient to remain off cigarettes and to have a repeat CAT scan in one year. She can follow up with me on an as-needed basis.

## 2019-05-20 NOTE — REVIEW OF SYSTEMS
[Fatigue] : fatigue [Sinus Problems] : sinus problems [As Noted in HPI] : as noted in HPI [Reflux] : reflux [Trauma] : ~T physical trauma [Fracture] : fracture [Negative] : Cardiovascular

## 2019-07-01 ENCOUNTER — TRANSCRIPTION ENCOUNTER (OUTPATIENT)
Age: 59
End: 2019-07-01

## 2019-07-15 ENCOUNTER — OUTPATIENT (OUTPATIENT)
Dept: OUTPATIENT SERVICES | Facility: HOSPITAL | Age: 59
LOS: 1 days | End: 2019-07-15
Payer: COMMERCIAL

## 2019-07-15 VITALS
OXYGEN SATURATION: 98 % | SYSTOLIC BLOOD PRESSURE: 116 MMHG | HEART RATE: 60 BPM | RESPIRATION RATE: 16 BRPM | DIASTOLIC BLOOD PRESSURE: 72 MMHG | HEIGHT: 66 IN | TEMPERATURE: 98 F

## 2019-07-15 DIAGNOSIS — Z01.818 ENCOUNTER FOR OTHER PREPROCEDURAL EXAMINATION: ICD-10-CM

## 2019-07-15 DIAGNOSIS — Z98.890 OTHER SPECIFIED POSTPROCEDURAL STATES: Chronic | ICD-10-CM

## 2019-07-15 DIAGNOSIS — S62.152A DISPLACED FRACTURE OF HOOK PROCESS OF HAMATE [UNCIFORM] BONE, LEFT WRIST, INITIAL ENCOUNTER FOR CLOSED FRACTURE: ICD-10-CM

## 2019-07-15 LAB
ALBUMIN SERPL ELPH-MCNC: 3.8 G/DL — SIGNIFICANT CHANGE UP (ref 3.3–5)
ALP SERPL-CCNC: 105 U/L — SIGNIFICANT CHANGE UP (ref 40–120)
ALT FLD-CCNC: 17 U/L — SIGNIFICANT CHANGE UP (ref 12–78)
ANION GAP SERPL CALC-SCNC: 6 MMOL/L — SIGNIFICANT CHANGE UP (ref 5–17)
AST SERPL-CCNC: 14 U/L — LOW (ref 15–37)
BILIRUB SERPL-MCNC: 0.4 MG/DL — SIGNIFICANT CHANGE UP (ref 0.2–1.2)
BUN SERPL-MCNC: 9 MG/DL — SIGNIFICANT CHANGE UP (ref 7–23)
CALCIUM SERPL-MCNC: 9.2 MG/DL — SIGNIFICANT CHANGE UP (ref 8.5–10.1)
CHLORIDE SERPL-SCNC: 106 MMOL/L — SIGNIFICANT CHANGE UP (ref 96–108)
CO2 SERPL-SCNC: 30 MMOL/L — SIGNIFICANT CHANGE UP (ref 22–31)
CREAT SERPL-MCNC: 0.59 MG/DL — SIGNIFICANT CHANGE UP (ref 0.5–1.3)
GLUCOSE SERPL-MCNC: 104 MG/DL — HIGH (ref 70–99)
HCT VFR BLD CALC: 38.5 % — SIGNIFICANT CHANGE UP (ref 34.5–45)
HCV AB S/CO SERPL IA: 0.17 S/CO — SIGNIFICANT CHANGE UP (ref 0–0.99)
HCV AB SERPL-IMP: SIGNIFICANT CHANGE UP
HGB BLD-MCNC: 12.5 G/DL — SIGNIFICANT CHANGE UP (ref 11.5–15.5)
HIV 1+2 AB+HIV1 P24 AG SERPL QL IA: SIGNIFICANT CHANGE UP
MCHC RBC-ENTMCNC: 31 PG — SIGNIFICANT CHANGE UP (ref 27–34)
MCHC RBC-ENTMCNC: 32.5 GM/DL — SIGNIFICANT CHANGE UP (ref 32–36)
MCV RBC AUTO: 95.5 FL — SIGNIFICANT CHANGE UP (ref 80–100)
NRBC # BLD: 0 /100 WBCS — SIGNIFICANT CHANGE UP (ref 0–0)
PLATELET # BLD AUTO: 280 K/UL — SIGNIFICANT CHANGE UP (ref 150–400)
POTASSIUM SERPL-MCNC: 4.2 MMOL/L — SIGNIFICANT CHANGE UP (ref 3.5–5.3)
POTASSIUM SERPL-SCNC: 4.2 MMOL/L — SIGNIFICANT CHANGE UP (ref 3.5–5.3)
PROT SERPL-MCNC: 7.4 G/DL — SIGNIFICANT CHANGE UP (ref 6–8.3)
RBC # BLD: 4.03 M/UL — SIGNIFICANT CHANGE UP (ref 3.8–5.2)
RBC # FLD: 13.8 % — SIGNIFICANT CHANGE UP (ref 10.3–14.5)
SODIUM SERPL-SCNC: 142 MMOL/L — SIGNIFICANT CHANGE UP (ref 135–145)
WBC # BLD: 5.73 K/UL — SIGNIFICANT CHANGE UP (ref 3.8–10.5)
WBC # FLD AUTO: 5.73 K/UL — SIGNIFICANT CHANGE UP (ref 3.8–10.5)

## 2019-07-15 PROCEDURE — 36415 COLL VENOUS BLD VENIPUNCTURE: CPT

## 2019-07-15 PROCEDURE — 80053 COMPREHEN METABOLIC PANEL: CPT

## 2019-07-15 PROCEDURE — 93005 ELECTROCARDIOGRAM TRACING: CPT

## 2019-07-15 PROCEDURE — 85027 COMPLETE CBC AUTOMATED: CPT

## 2019-07-15 PROCEDURE — 86803 HEPATITIS C AB TEST: CPT

## 2019-07-15 PROCEDURE — G0463: CPT

## 2019-07-15 PROCEDURE — 87389 HIV-1 AG W/HIV-1&-2 AB AG IA: CPT

## 2019-07-15 PROCEDURE — 93010 ELECTROCARDIOGRAM REPORT: CPT

## 2019-07-15 NOTE — H&P PST ADULT - NSICDXPASTMEDICALHX_GEN_ALL_CORE_FT
PAST MEDICAL HISTORY:  Bronchitis     Desensitization to allergens     Displaced fracture of hook process of hamate [unciform] bone, left wrist, initial encounter for closed fracture     Heart murmur     Psoriasis on scalp  last episode 2 years ago

## 2019-07-15 NOTE — H&P PST ADULT - NSANTHOSAYNRD_GEN_A_CORE
No. ROCÍO screening performed.  STOP BANG Legend: 0-2 = LOW Risk; 3-4 = INTERMEDIATE Risk; 5-8 = HIGH Risk

## 2019-07-15 NOTE — H&P PST ADULT - NSICDXPROBLEM_GEN_ALL_CORE_FT
PROBLEM DIAGNOSES  Problem: Displaced fracture of hook process of hamate [unciform] bone, left wrist, initial encounter for closed fracture  Assessment and Plan:   Check labs cbc cmp  ekg  Medical Clearance PROBLEM DIAGNOSES  Problem: Displaced fracture of hook process of hamate [unciform] bone, left wrist, initial encounter for closed fracture  Assessment and Plan:   Check labs cbc cmp  ekg  Medical Clearance  Hibiclens and preop instructions were given  Patient will stop Ningxia 7/16  no medications the morning of surgery PROBLEM DIAGNOSES  Problem: Displaced fracture of hook process of hamate [unciform] bone, left wrist, initial encounter for closed fracture  Assessment and Plan:   Check labs cbc cmp  ekg  Medical Clearance  Hibiclens and preop instructions were given  Patient will stop Ningxia 7/16  no medications the morning of surgery  Discussed with patient need for smoking Cessation written and verbal instructions were given  patient verbalizes understanding of instructions

## 2019-07-15 NOTE — H&P PST ADULT - ASSESSMENT
60 yo female scheduled for Excision Left Hook of the Hamate - Mini Fluoro on 7/23/19 with Dr Camargo.

## 2019-07-15 NOTE — H&P PST ADULT - HISTORY OF PRESENT ILLNESS
58 yo female scheduled for Excision Left Hook of the Hamate - Mini Fluoro on 7/23/19 with Dr Camargo.  Patient states she fell 4/18/19 and thje fracture has not healed.  Patient states she has intermittent pain.  Patient is right hand dominant.

## 2019-07-17 PROBLEM — R01.1 CARDIAC MURMUR, UNSPECIFIED: Chronic | Status: ACTIVE | Noted: 2019-07-15

## 2019-07-17 PROBLEM — J40 BRONCHITIS, NOT SPECIFIED AS ACUTE OR CHRONIC: Chronic | Status: ACTIVE | Noted: 2019-07-15

## 2019-07-17 PROBLEM — S62.152A: Chronic | Status: ACTIVE | Noted: 2019-07-15

## 2019-07-17 PROBLEM — L40.9 PSORIASIS, UNSPECIFIED: Chronic | Status: ACTIVE | Noted: 2019-07-15

## 2019-07-17 PROBLEM — Z51.6: Chronic | Status: ACTIVE | Noted: 2019-07-15

## 2019-07-18 ENCOUNTER — APPOINTMENT (OUTPATIENT)
Dept: INTERNAL MEDICINE | Facility: CLINIC | Age: 59
End: 2019-07-18
Payer: COMMERCIAL

## 2019-07-18 VITALS — HEIGHT: 65 IN | WEIGHT: 173 LBS | BODY MASS INDEX: 28.82 KG/M2

## 2019-07-18 DIAGNOSIS — S62.102A FRACTURE OF UNSPECIFIED CARPAL BONE, LEFT WRIST, INITIAL ENCOUNTER FOR CLOSED FRACTURE: ICD-10-CM

## 2019-07-18 DIAGNOSIS — M77.9 ENTHESOPATHY, UNSPECIFIED: ICD-10-CM

## 2019-07-18 PROCEDURE — 99214 OFFICE O/P EST MOD 30 MIN: CPT

## 2019-07-18 NOTE — CONSULT LETTER
[Consult Letter:] : I had the pleasure of evaluating your patient, [unfilled]. [Sincerely,] : Sincerely, [FreeTextEntry1] : Wrist surgery

## 2019-07-18 NOTE — ASSESSMENT
[Patient Optimized for Surgery] : Patient optimized for surgery [No Further Testing Recommended] : no further testing recommended [Continue medications as is] : Continue current medications [FreeTextEntry4] : This is a 59-year-old woman scheduled for left wrist surgery. Her vital signs are stable the physical examination was unchanged. Her EKG and blood to normal. She is medically cleared for surgery

## 2019-07-18 NOTE — HISTORY OF PRESENT ILLNESS
[No Pertinent Pulmonary History] : no history of asthma, COPD, sleep apnea, or smoking [No Adverse Anesthesia Reaction] : no adverse anesthesia reaction in self or family member [FreeTextEntry1] : Left wrist surgery [FreeTextEntry3] : dr. Frank [FreeTextEntry4] : this is a 59-year-old woman in good health aside from a benign small pulmonary nodule of scheduled for surgery on her left wrist for fracture\par .She also has a small mitral insufficiency

## 2019-07-22 ENCOUNTER — TRANSCRIPTION ENCOUNTER (OUTPATIENT)
Age: 59
End: 2019-07-22

## 2019-07-23 ENCOUNTER — OUTPATIENT (OUTPATIENT)
Dept: OUTPATIENT SERVICES | Facility: HOSPITAL | Age: 59
LOS: 1 days | End: 2019-07-23
Payer: COMMERCIAL

## 2019-07-23 ENCOUNTER — RESULT REVIEW (OUTPATIENT)
Age: 59
End: 2019-07-23

## 2019-07-23 VITALS
DIASTOLIC BLOOD PRESSURE: 82 MMHG | HEART RATE: 60 BPM | TEMPERATURE: 97 F | OXYGEN SATURATION: 100 % | SYSTOLIC BLOOD PRESSURE: 119 MMHG | HEIGHT: 66 IN | WEIGHT: 169.09 LBS | RESPIRATION RATE: 19 BRPM

## 2019-07-23 VITALS
SYSTOLIC BLOOD PRESSURE: 122 MMHG | OXYGEN SATURATION: 99 % | DIASTOLIC BLOOD PRESSURE: 70 MMHG | RESPIRATION RATE: 15 BRPM | HEART RATE: 70 BPM

## 2019-07-23 DIAGNOSIS — S62.152A DISPLACED FRACTURE OF HOOK PROCESS OF HAMATE [UNCIFORM] BONE, LEFT WRIST, INITIAL ENCOUNTER FOR CLOSED FRACTURE: ICD-10-CM

## 2019-07-23 DIAGNOSIS — Z98.890 OTHER SPECIFIED POSTPROCEDURAL STATES: Chronic | ICD-10-CM

## 2019-07-23 PROCEDURE — 25210 REMOVAL OF WRIST BONE: CPT | Mod: LT

## 2019-07-23 PROCEDURE — 88304 TISSUE EXAM BY PATHOLOGIST: CPT | Mod: 26

## 2019-07-23 PROCEDURE — 88311 DECALCIFY TISSUE: CPT

## 2019-07-23 PROCEDURE — 88311 DECALCIFY TISSUE: CPT | Mod: 26

## 2019-07-23 PROCEDURE — 88304 TISSUE EXAM BY PATHOLOGIST: CPT

## 2019-07-23 PROCEDURE — C1889: CPT

## 2019-07-23 RX ORDER — OXYCODONE HYDROCHLORIDE 5 MG/1
10 TABLET ORAL ONCE
Refills: 0 | Status: DISCONTINUED | OUTPATIENT
Start: 2019-07-23 | End: 2019-07-23

## 2019-07-23 RX ORDER — SODIUM CHLORIDE 9 MG/ML
1000 INJECTION, SOLUTION INTRAVENOUS
Refills: 0 | Status: DISCONTINUED | OUTPATIENT
Start: 2019-07-23 | End: 2019-07-23

## 2019-07-23 RX ORDER — OXYCODONE HYDROCHLORIDE 5 MG/1
5 TABLET ORAL ONCE
Refills: 0 | Status: DISCONTINUED | OUTPATIENT
Start: 2019-07-23 | End: 2019-07-23

## 2019-07-23 RX ORDER — ONDANSETRON 8 MG/1
4 TABLET, FILM COATED ORAL ONCE
Refills: 0 | Status: DISCONTINUED | OUTPATIENT
Start: 2019-07-23 | End: 2019-07-23

## 2019-07-23 RX ORDER — OXYCODONE AND ACETAMINOPHEN 5; 325 MG/1; MG/1
1 TABLET ORAL
Qty: 15 | Refills: 0
Start: 2019-07-23 | End: 2019-07-29

## 2019-07-23 RX ORDER — CEPHALEXIN 500 MG
1 CAPSULE ORAL
Qty: 12 | Refills: 0
Start: 2019-07-23 | End: 2019-07-25

## 2019-07-23 RX ORDER — HYDROMORPHONE HYDROCHLORIDE 2 MG/ML
0.5 INJECTION INTRAMUSCULAR; INTRAVENOUS; SUBCUTANEOUS
Refills: 0 | Status: DISCONTINUED | OUTPATIENT
Start: 2019-07-23 | End: 2019-07-23

## 2019-07-23 RX ORDER — SODIUM CHLORIDE 9 MG/ML
3 INJECTION INTRAMUSCULAR; INTRAVENOUS; SUBCUTANEOUS ONCE
Refills: 0 | Status: COMPLETED | OUTPATIENT
Start: 2019-07-23 | End: 2019-07-23

## 2019-07-23 RX ORDER — CEFAZOLIN SODIUM 1 G
2000 VIAL (EA) INJECTION ONCE
Refills: 0 | Status: COMPLETED | OUTPATIENT
Start: 2019-07-23 | End: 2019-07-23

## 2019-07-23 RX ADMIN — SODIUM CHLORIDE 75 MILLILITER(S): 9 INJECTION, SOLUTION INTRAVENOUS at 10:11

## 2019-07-23 RX ADMIN — SODIUM CHLORIDE 30 MILLILITER(S): 9 INJECTION, SOLUTION INTRAVENOUS at 07:11

## 2019-07-23 RX ADMIN — SODIUM CHLORIDE 3 MILLILITER(S): 9 INJECTION INTRAMUSCULAR; INTRAVENOUS; SUBCUTANEOUS at 07:20

## 2019-07-23 NOTE — ASU DISCHARGE PLAN (ADULT/PEDIATRIC) - ASU DC SPECIAL INSTRUCTIONSFT
Follow up with Dr Hendricks on August 2nd. Call office for appointment. Take medications as prescribed. Keep dressing clean, dry, and intact. Rest, ice, and elevate affected extremity. Take Keflex for a total of 4 days.

## 2019-07-23 NOTE — ASU PATIENT PROFILE, ADULT - PMH
Bronchitis    Desensitization to allergens    Displaced fracture of hook process of hamate [unciform] bone, left wrist, initial encounter for closed fracture    Heart murmur    Psoriasis  on scalp  last episode 2 years ago

## 2019-07-23 NOTE — ASU DISCHARGE PLAN (ADULT/PEDIATRIC) - PAIN MANAGEMENT
Prescription called to pharmacy Prescription called to pharmacy/Prescriptions electronically submitted to pharmacy from [facility]

## 2019-07-23 NOTE — ASU DISCHARGE PLAN (ADULT/PEDIATRIC) - CALL YOUR DOCTOR IF YOU HAVE ANY OF THE FOLLOWING:
Numbness, tingling, color or temperature change to extremity/Fever greater than (need to indicate Fahrenheit or Celsius)/Swelling that gets worse/Wound/Surgical Site with redness, or foul smelling discharge or pus/Bleeding that does not stop

## 2019-07-23 NOTE — ASU DISCHARGE PLAN (ADULT/PEDIATRIC) - NO WEIGHT BEARING DURATION
nonweight bearing left hand/wrist, please keep dressing clean/dry and intact non weight bearing left hand/wrist, please keep dressing clean/dry and intact

## 2019-07-23 NOTE — ASU DISCHARGE PLAN (ADULT/PEDIATRIC) - CARE PROVIDER_API CALL
Todd Hendricks (MD)  Orthopaedic Surgery  [address] [place] [address]  Phone: [phone]  Fax: [phone]  Follow Up Time:

## 2019-07-24 LAB — SURGICAL PATHOLOGY STUDY: SIGNIFICANT CHANGE UP

## 2019-09-07 ENCOUNTER — EMERGENCY (EMERGENCY)
Facility: HOSPITAL | Age: 59
LOS: 1 days | Discharge: HOME | End: 2019-09-07
Attending: EMERGENCY MEDICINE | Admitting: EMERGENCY MEDICINE
Payer: COMMERCIAL

## 2019-09-07 VITALS
OXYGEN SATURATION: 99 % | DIASTOLIC BLOOD PRESSURE: 76 MMHG | RESPIRATION RATE: 14 BRPM | HEART RATE: 60 BPM | TEMPERATURE: 98 F | SYSTOLIC BLOOD PRESSURE: 134 MMHG

## 2019-09-07 VITALS
HEART RATE: 63 BPM | DIASTOLIC BLOOD PRESSURE: 56 MMHG | RESPIRATION RATE: 14 BRPM | OXYGEN SATURATION: 99 % | TEMPERATURE: 99 F | SYSTOLIC BLOOD PRESSURE: 147 MMHG | WEIGHT: 169.98 LBS

## 2019-09-07 DIAGNOSIS — Z98.890 OTHER SPECIFIED POSTPROCEDURAL STATES: Chronic | ICD-10-CM

## 2019-09-07 LAB
ALBUMIN SERPL ELPH-MCNC: 4 G/DL — SIGNIFICANT CHANGE UP (ref 3.3–5)
ALP SERPL-CCNC: 102 U/L — SIGNIFICANT CHANGE UP (ref 40–120)
ALT FLD-CCNC: 16 U/L — SIGNIFICANT CHANGE UP (ref 12–78)
ANION GAP SERPL CALC-SCNC: 5 MMOL/L — SIGNIFICANT CHANGE UP (ref 5–17)
AST SERPL-CCNC: 15 U/L — SIGNIFICANT CHANGE UP (ref 15–37)
BILIRUB SERPL-MCNC: 0.4 MG/DL — SIGNIFICANT CHANGE UP (ref 0.2–1.2)
BUN SERPL-MCNC: 10 MG/DL — SIGNIFICANT CHANGE UP (ref 7–23)
CALCIUM SERPL-MCNC: 9.3 MG/DL — SIGNIFICANT CHANGE UP (ref 8.5–10.1)
CHLORIDE SERPL-SCNC: 107 MMOL/L — SIGNIFICANT CHANGE UP (ref 96–108)
CK MB CFR SERPL CALC: <1 NG/ML — SIGNIFICANT CHANGE UP (ref 0–3.6)
CO2 SERPL-SCNC: 29 MMOL/L — SIGNIFICANT CHANGE UP (ref 22–31)
CREAT SERPL-MCNC: 0.57 MG/DL — SIGNIFICANT CHANGE UP (ref 0.5–1.3)
GLUCOSE SERPL-MCNC: 93 MG/DL — SIGNIFICANT CHANGE UP (ref 70–99)
HCT VFR BLD CALC: 40.5 % — SIGNIFICANT CHANGE UP (ref 34.5–45)
HGB BLD-MCNC: 13 G/DL — SIGNIFICANT CHANGE UP (ref 11.5–15.5)
MCHC RBC-ENTMCNC: 30.8 PG — SIGNIFICANT CHANGE UP (ref 27–34)
MCHC RBC-ENTMCNC: 32.1 GM/DL — SIGNIFICANT CHANGE UP (ref 32–36)
MCV RBC AUTO: 96 FL — SIGNIFICANT CHANGE UP (ref 80–100)
NRBC # BLD: 0 /100 WBCS — SIGNIFICANT CHANGE UP (ref 0–0)
PLATELET # BLD AUTO: 308 K/UL — SIGNIFICANT CHANGE UP (ref 150–400)
POTASSIUM SERPL-MCNC: 4.5 MMOL/L — SIGNIFICANT CHANGE UP (ref 3.5–5.3)
POTASSIUM SERPL-SCNC: 4.5 MMOL/L — SIGNIFICANT CHANGE UP (ref 3.5–5.3)
PROT SERPL-MCNC: 7.7 G/DL — SIGNIFICANT CHANGE UP (ref 6–8.3)
RBC # BLD: 4.22 M/UL — SIGNIFICANT CHANGE UP (ref 3.8–5.2)
RBC # FLD: 13.9 % — SIGNIFICANT CHANGE UP (ref 10.3–14.5)
SODIUM SERPL-SCNC: 141 MMOL/L — SIGNIFICANT CHANGE UP (ref 135–145)
TROPONIN I SERPL-MCNC: <.015 NG/ML — SIGNIFICANT CHANGE UP (ref 0.01–0.04)
TROPONIN I SERPL-MCNC: <.015 NG/ML — SIGNIFICANT CHANGE UP (ref 0.01–0.04)
WBC # BLD: 8.25 K/UL — SIGNIFICANT CHANGE UP (ref 3.8–10.5)
WBC # FLD AUTO: 8.25 K/UL — SIGNIFICANT CHANGE UP (ref 3.8–10.5)

## 2019-09-07 PROCEDURE — 71046 X-RAY EXAM CHEST 2 VIEWS: CPT

## 2019-09-07 PROCEDURE — 82553 CREATINE MB FRACTION: CPT

## 2019-09-07 PROCEDURE — 93010 ELECTROCARDIOGRAM REPORT: CPT

## 2019-09-07 PROCEDURE — 80053 COMPREHEN METABOLIC PANEL: CPT

## 2019-09-07 PROCEDURE — 85027 COMPLETE CBC AUTOMATED: CPT

## 2019-09-07 PROCEDURE — 99284 EMERGENCY DEPT VISIT MOD MDM: CPT

## 2019-09-07 PROCEDURE — 84484 ASSAY OF TROPONIN QUANT: CPT

## 2019-09-07 PROCEDURE — 99284 EMERGENCY DEPT VISIT MOD MDM: CPT | Mod: 25

## 2019-09-07 PROCEDURE — 93005 ELECTROCARDIOGRAM TRACING: CPT

## 2019-09-07 PROCEDURE — 71046 X-RAY EXAM CHEST 2 VIEWS: CPT | Mod: 26

## 2019-09-07 PROCEDURE — 36415 COLL VENOUS BLD VENIPUNCTURE: CPT

## 2019-09-07 RX ORDER — LIDOCAINE 4 G/100G
1 CREAM TOPICAL ONCE
Refills: 0 | Status: COMPLETED | OUTPATIENT
Start: 2019-09-07 | End: 2019-09-07

## 2019-09-07 RX ORDER — ASPIRIN/CALCIUM CARB/MAGNESIUM 324 MG
325 TABLET ORAL ONCE
Refills: 0 | Status: COMPLETED | OUTPATIENT
Start: 2019-09-07 | End: 2019-09-07

## 2019-09-07 RX ADMIN — Medication 325 MILLIGRAM(S): at 12:20

## 2019-09-07 RX ADMIN — LIDOCAINE 1 PATCH: 4 CREAM TOPICAL at 15:43

## 2019-09-07 NOTE — ED PROVIDER NOTE - CROS ED CARDIOVAS ALL NEG
[facility] in [address] [place] [address] Drive,[address]    [facility]    Phone:  [phone]    Fax:  [phone]           Demetrio Zoltan   MRN: [id number]    Department:  [facility] END   Date of Visit:  6/2/2017 Tylenol and Motrin for fever and pain. Increase fluids keep well-hydrated. Steam inhalation will be helpful. Also a nightly humidifier can help.       Discharge References/Attachments     URI, VIRAL, NO ABX (ADULT) (ENGLISH)      Disclosure     Insurance Immediate Cares. Follow-up care is at the discretion of that Physician. IF THERE IS ANY CHANGE OR WORSENING OF YOUR CONDITION, CALL YOUR PRIMARY CARE PHYSICIAN AT ONCE OR GO TO THE EMERGENCY DEPARTMENT.     If you have been prescribed any medication(s), - If you don’t have insurance, [facility] has partnered with [facility] to help you get signed up for insurance coverage.   [facility] is a Federal Navigator program that can help with your Affordable Care Act cover If you have questions, you can call [phone] to talk to our [facility] Staff. Remember, [employer] is NOT to be used for urgent needs. For medical emergencies, dial 911. - - -

## 2019-09-07 NOTE — ED ADULT NURSE NOTE - OBJECTIVE STATEMENT
Pt. received alert and oriented x3 with chief complaint of left side chest/shoulder/neck pain w/ headache for 1 day and trouble taking deep breaths without pain to left chest. Pt. presents w/ clear lung sounds upon auscultation. Pt. placed on continuous cardiac monitor with continuous pulse ox. EKG performed at bedside upon arrival.

## 2019-09-07 NOTE — ED PROVIDER NOTE - OBJECTIVE STATEMENT
60 y/o F with c/o left sided chest pain x 3 days.  Pt states pain is worse with change in position, palpations and taking a deep breath.  Pt was seen in the urgent care and told to come to the ER.

## 2019-09-07 NOTE — ED PROVIDER NOTE - CROS ED GI ALL NEG
Problem: Pain Management  Goal: Pain level will decrease to patient's comfort goal  Pt calls appropriately when in need of pain medication.    Problem: Mobility  Goal: Risk for activity intolerance will decrease  Order to lie flat until midnight.  Afterwards, gets up to bathroom with 1 staff assist.       negative...

## 2019-09-07 NOTE — ED PROVIDER NOTE - PATIENT PORTAL LINK FT
You can access the FollowMyHealth Patient Portal offered by [facility] by registering at the following website: [email or web address]. By joining [facility]’s FollowMyHealth portal, you will also be able to view your health information using other applications (apps) compatible with our system.

## 2019-09-09 ENCOUNTER — MEDICATION RENEWAL (OUTPATIENT)
Age: 59
End: 2019-09-09

## 2019-09-10 ENCOUNTER — MEDICATION RENEWAL (OUTPATIENT)
Age: 59
End: 2019-09-10

## 2019-09-10 ENCOUNTER — APPOINTMENT (OUTPATIENT)
Dept: INTERNAL MEDICINE | Facility: CLINIC | Age: 59
End: 2019-09-10
Payer: COMMERCIAL

## 2019-09-10 ENCOUNTER — NON-APPOINTMENT (OUTPATIENT)
Age: 59
End: 2019-09-10

## 2019-09-10 VITALS — BODY MASS INDEX: 28.49 KG/M2 | WEIGHT: 171 LBS | HEIGHT: 65 IN

## 2019-09-10 VITALS — DIASTOLIC BLOOD PRESSURE: 60 MMHG | SYSTOLIC BLOOD PRESSURE: 110 MMHG

## 2019-09-10 PROCEDURE — 99214 OFFICE O/P EST MOD 30 MIN: CPT | Mod: 25

## 2019-09-10 PROCEDURE — 93000 ELECTROCARDIOGRAM COMPLETE: CPT

## 2019-09-10 NOTE — ASSESSMENT
[FreeTextEntry1] : This is a 59-year-old female who was history of smoking who presents with left precordial chest pain radiating to her shoulder starting 3 days ago which has since resolved she also has a history of mitral insufficiency very mild heart murmur and fatigue. Her vital signs are stable her physical examination was completely normal her EKG did not reveal any acute changes she was referred to cardiology for an echo and a thallium stress test

## 2019-09-10 NOTE — HISTORY OF PRESENT ILLNESS
[de-identified] : This is a 59-year-old woman who 3 days ago went to the emergency room with left precordial chest pain radiating to her left shoulder. At that time cardiac enzymes and EKG and chest x-ray were performed which were normal and she was referred to cardiologist. She is here today for a reevaluation. The chest pain has 90% resolved. She did not have a thallium stress test or an echocardiogram recently.

## 2019-09-13 ENCOUNTER — TRANSCRIPTION ENCOUNTER (OUTPATIENT)
Age: 59
End: 2019-09-13

## 2019-09-25 ENCOUNTER — APPOINTMENT (OUTPATIENT)
Dept: CARDIOLOGY | Facility: CLINIC | Age: 59
End: 2019-09-25

## 2019-09-27 ENCOUNTER — APPOINTMENT (OUTPATIENT)
Dept: INTERNAL MEDICINE | Facility: CLINIC | Age: 59
End: 2019-09-27

## 2019-09-29 ENCOUNTER — TRANSCRIPTION ENCOUNTER (OUTPATIENT)
Age: 59
End: 2019-09-29

## 2019-10-08 ENCOUNTER — APPOINTMENT (OUTPATIENT)
Dept: INTERNAL MEDICINE | Facility: CLINIC | Age: 59
End: 2019-10-08
Payer: COMMERCIAL

## 2019-10-08 VITALS — BODY MASS INDEX: 28.99 KG/M2 | HEIGHT: 65 IN | WEIGHT: 174 LBS

## 2019-10-08 VITALS — DIASTOLIC BLOOD PRESSURE: 60 MMHG | SYSTOLIC BLOOD PRESSURE: 110 MMHG

## 2019-10-08 DIAGNOSIS — J32.9 CHRONIC SINUSITIS, UNSPECIFIED: ICD-10-CM

## 2019-10-08 PROCEDURE — 99213 OFFICE O/P EST LOW 20 MIN: CPT

## 2019-10-08 NOTE — ASSESSMENT
[FreeTextEntry1] : The patient's physical examination was normal. Cultures were taken of her nasal sinuses and throat. She was told to come cleat to course of Augmentin.

## 2019-10-08 NOTE — HISTORY OF PRESENT ILLNESS
[de-identified] : This is a 59-year-old woman who was treated with the Augmentin 850 mg b.i.d. for one week who she'll because of continued nasal secretions which are clear. She denies any fever or any chills or cough

## 2019-10-11 LAB — BACTERIA NOSE AEROBE CULT: NORMAL

## 2019-11-25 ENCOUNTER — APPOINTMENT (OUTPATIENT)
Dept: INTERNAL MEDICINE | Facility: CLINIC | Age: 59
End: 2019-11-25
Payer: COMMERCIAL

## 2019-11-25 VITALS — BODY MASS INDEX: 27.99 KG/M2 | TEMPERATURE: 98 F | HEIGHT: 65 IN | WEIGHT: 168 LBS

## 2019-11-25 VITALS — DIASTOLIC BLOOD PRESSURE: 70 MMHG | SYSTOLIC BLOOD PRESSURE: 110 MMHG

## 2019-11-25 DIAGNOSIS — Z87.09 PERSONAL HISTORY OF OTHER DISEASES OF THE RESPIRATORY SYSTEM: ICD-10-CM

## 2019-11-25 LAB — S PYO AG SPEC QL IA: NEGATIVE

## 2019-11-25 PROCEDURE — 99213 OFFICE O/P EST LOW 20 MIN: CPT | Mod: 25

## 2019-11-25 PROCEDURE — 87880 STREP A ASSAY W/OPTIC: CPT | Mod: QW

## 2019-11-25 NOTE — ASSESSMENT
[FreeTextEntry1] : This is a 59-year-old woman who presents with pharyngitis rhinitis and a nonproductive cough. Her vital signs are stable her physical examination is positive for occasional rhonchi. A strep screen was negative. She was treated with doxycycline 100 mg daily for 7 days

## 2019-11-25 NOTE — HISTORY OF PRESENT ILLNESS
[de-identified] : This is a 59-year-old patient who was complaining of pharyngitis rhinitis and nonproductive cough this is not associated with any fever or chills

## 2019-11-25 NOTE — REVIEW OF SYSTEMS
[Nasal Discharge] : nasal discharge [Cough] : cough [Sore Throat] : sore throat [Negative] : Cardiovascular

## 2019-11-25 NOTE — PHYSICAL EXAM
[No Lymphadenopathy] : no lymphadenopathy [Normal] : soft, non-tender, non-distended, no masses palpated, no HSM and normal bowel sounds [de-identified] : mild pharyngitis [de-identified] : occasional cough

## 2019-12-23 ENCOUNTER — TRANSCRIPTION ENCOUNTER (OUTPATIENT)
Age: 59
End: 2019-12-23

## 2019-12-23 RX ORDER — DOXYCYCLINE HYCLATE 100 MG/1
100 CAPSULE ORAL DAILY
Qty: 7 | Refills: 0 | Status: DISCONTINUED | COMMUNITY
Start: 2019-11-25 | End: 2019-12-23

## 2020-01-08 ENCOUNTER — RESULT REVIEW (OUTPATIENT)
Age: 60
End: 2020-01-08

## 2020-01-16 ENCOUNTER — APPOINTMENT (OUTPATIENT)
Dept: CT IMAGING | Facility: IMAGING CENTER | Age: 60
End: 2020-01-16
Payer: COMMERCIAL

## 2020-01-16 ENCOUNTER — OUTPATIENT (OUTPATIENT)
Dept: OUTPATIENT SERVICES | Facility: HOSPITAL | Age: 60
LOS: 1 days | End: 2020-01-16
Payer: COMMERCIAL

## 2020-01-16 DIAGNOSIS — Z00.8 ENCOUNTER FOR OTHER GENERAL EXAMINATION: ICD-10-CM

## 2020-01-16 DIAGNOSIS — Z98.890 OTHER SPECIFIED POSTPROCEDURAL STATES: Chronic | ICD-10-CM

## 2020-01-16 PROCEDURE — 74177 CT ABD & PELVIS W/CONTRAST: CPT | Mod: 26

## 2020-01-16 PROCEDURE — 74177 CT ABD & PELVIS W/CONTRAST: CPT

## 2020-03-02 ENCOUNTER — LABORATORY RESULT (OUTPATIENT)
Age: 60
End: 2020-03-02

## 2020-03-02 ENCOUNTER — APPOINTMENT (OUTPATIENT)
Dept: INTERNAL MEDICINE | Facility: CLINIC | Age: 60
End: 2020-03-02
Payer: COMMERCIAL

## 2020-03-02 ENCOUNTER — NON-APPOINTMENT (OUTPATIENT)
Age: 60
End: 2020-03-02

## 2020-03-02 VITALS — BODY MASS INDEX: 28.49 KG/M2 | WEIGHT: 171 LBS | HEIGHT: 65 IN

## 2020-03-02 VITALS — DIASTOLIC BLOOD PRESSURE: 70 MMHG | SYSTOLIC BLOOD PRESSURE: 110 MMHG

## 2020-03-02 LAB — SAVE SPECIMEN: NORMAL

## 2020-03-02 PROCEDURE — 93000 ELECTROCARDIOGRAM COMPLETE: CPT

## 2020-03-02 PROCEDURE — 99396 PREV VISIT EST AGE 40-64: CPT | Mod: 25

## 2020-03-02 PROCEDURE — 36415 COLL VENOUS BLD VENIPUNCTURE: CPT

## 2020-03-02 NOTE — HISTORY OF PRESENT ILLNESS
[de-identified] : This is a 59-year-old patient who is here for her annual examination. The patient has a history of a pulmonary nodule, mild mitral insufficiency, and asthma.

## 2020-03-02 NOTE — ASSESSMENT
[FreeTextEntry1] : Vital signs are stable her physical examination was normal except for a grade 1/6 systolic ejection murmur and obesity. Her EKG did not reveal any acute changes I advised the patient that she is due for a mammogram, screening CT of the chest, and echocardiogram

## 2020-03-02 NOTE — PHYSICAL EXAM
[Declined Breast Exam] : declined breast exam  [Declined Rectal Exam] : declined rectal exam [Normal] : no rash [de-identified] : Grade 1/6 systolic ejection murmur

## 2020-03-02 NOTE — HEALTH RISK ASSESSMENT
[] : No [No] : No [0-5] : 0-5 [Never (0 pts)] : Never (0 points) [1 or 2 (0 pts)] : 1 or 2 (0 points) [No falls in past year] : Patient reported no falls in the past year [0] : 2) Feeling down, depressed, or hopeless: Not at all (0) [DUP1Hycyl] : 0 [Fully functional (bathing, dressing, toileting, transferring, walking, feeding)] : Fully functional (bathing, dressing, toileting, transferring, walking, feeding) [Fully functional (using the telephone, shopping, preparing meals, housekeeping, doing laundry, using] : Fully functional and needs no help or supervision to perform IADLs (using the telephone, shopping, preparing meals, housekeeping, doing laundry, using transportation, managing medications and managing finances) [With Patient/Caregiver] : With Patient/Caregiver [I will adhere to the patient's wishes as expressed in the advance directive except as noted below.] : I will adhere to the patient's wishes as expressed in the advance directive except as noted below [AdvancecareDate] : 3/2/2020

## 2020-03-03 LAB
25(OH)D3 SERPL-MCNC: 46.8 NG/ML
ALBUMIN SERPL ELPH-MCNC: 4.5 G/DL
ALP BLD-CCNC: 91 U/L
ALT SERPL-CCNC: 8 U/L
ANION GAP SERPL CALC-SCNC: 13 MMOL/L
APPEARANCE: ABNORMAL
AST SERPL-CCNC: 15 U/L
BACTERIA: ABNORMAL
BASOPHILS # BLD AUTO: 0.03 K/UL
BASOPHILS NFR BLD AUTO: 0.5 %
BILIRUB SERPL-MCNC: <0.2 MG/DL
BILIRUBIN URINE: NEGATIVE
BLOOD URINE: NEGATIVE
BUN SERPL-MCNC: 13 MG/DL
CALCIUM SERPL-MCNC: 9.9 MG/DL
CHLORIDE SERPL-SCNC: 104 MMOL/L
CHOLEST SERPL-MCNC: 194 MG/DL
CHOLEST/HDLC SERPL: 3 RATIO
CO2 SERPL-SCNC: 24 MMOL/L
COLOR: YELLOW
CREAT SERPL-MCNC: 0.67 MG/DL
EOSINOPHIL # BLD AUTO: 0.06 K/UL
EOSINOPHIL NFR BLD AUTO: 0.9 %
ESTIMATED AVERAGE GLUCOSE: 114 MG/DL
FERRITIN SERPL-MCNC: 121 NG/ML
FOLATE SERPL-MCNC: 15.5 NG/ML
GLUCOSE QUALITATIVE U: NEGATIVE
GLUCOSE SERPL-MCNC: 91 MG/DL
HBA1C MFR BLD HPLC: 5.6 %
HCT VFR BLD CALC: 38.4 %
HDLC SERPL-MCNC: 65 MG/DL
HGB BLD-MCNC: 12 G/DL
HYALINE CASTS: 0 /LPF
IMM GRANULOCYTES NFR BLD AUTO: 0.3 %
KETONES URINE: NEGATIVE
LDLC SERPL CALC-MCNC: 107 MG/DL
LEUKOCYTE ESTERASE URINE: ABNORMAL
LYMPHOCYTES # BLD AUTO: 2.52 K/UL
LYMPHOCYTES NFR BLD AUTO: 39.8 %
MAN DIFF?: NORMAL
MCHC RBC-ENTMCNC: 30.5 PG
MCHC RBC-ENTMCNC: 31.3 GM/DL
MCV RBC AUTO: 97.7 FL
MICROSCOPIC-UA: NORMAL
MONOCYTES # BLD AUTO: 0.57 K/UL
MONOCYTES NFR BLD AUTO: 9 %
NEUTROPHILS # BLD AUTO: 3.13 K/UL
NEUTROPHILS NFR BLD AUTO: 49.5 %
NITRITE URINE: NEGATIVE
PH URINE: 6
PLATELET # BLD AUTO: 321 K/UL
POTASSIUM SERPL-SCNC: 4.7 MMOL/L
PROT SERPL-MCNC: 6.6 G/DL
PROTEIN URINE: NORMAL
RBC # BLD: 3.93 M/UL
RBC # FLD: 13.7 %
RED BLOOD CELLS URINE: 1 /HPF
SODIUM SERPL-SCNC: 141 MMOL/L
SPECIFIC GRAVITY URINE: 1.03
SQUAMOUS EPITHELIAL CELLS: 22 /HPF
T3RU NFR SERPL: 1.1 TBI
T4 SERPL-MCNC: 6.6 UG/DL
TRIGL SERPL-MCNC: 109 MG/DL
TSH SERPL-ACNC: 1.55 UIU/ML
URATE SERPL-MCNC: 2.8 MG/DL
UROBILINOGEN URINE: NORMAL
VIT B12 SERPL-MCNC: 696 PG/ML
WBC # FLD AUTO: 6.33 K/UL
WHITE BLOOD CELLS URINE: 36 /HPF

## 2020-03-05 LAB — BACTERIA UR CULT: NORMAL

## 2020-03-16 ENCOUNTER — LABORATORY RESULT (OUTPATIENT)
Age: 60
End: 2020-03-16

## 2020-04-02 DIAGNOSIS — H00.19 CHALAZION UNSPECIFIED EYE, UNSPECIFIED EYELID: ICD-10-CM

## 2020-04-02 DIAGNOSIS — A69.20 LYME DISEASE, UNSPECIFIED: ICD-10-CM

## 2020-04-25 ENCOUNTER — MESSAGE (OUTPATIENT)
Age: 60
End: 2020-04-25

## 2020-05-02 LAB
SARS-COV-2 IGG SERPL IA-ACNC: 0.2 RATIO
SARS-COV-2 IGG SERPL QL IA: NEGATIVE

## 2020-05-04 LAB
COHGB MFR BLDV: 2.9 %
GAS + CO PNL BLD: NORMAL
HGB BLD-MCNC: 12.6 G/DL
O2HB: 78 %

## 2020-05-24 ENCOUNTER — OUTPATIENT (OUTPATIENT)
Dept: OUTPATIENT SERVICES | Facility: HOSPITAL | Age: 60
LOS: 1 days | End: 2020-05-24

## 2020-05-24 DIAGNOSIS — Z00.00 ENCOUNTER FOR GENERAL ADULT MEDICAL EXAMINATION WITHOUT ABNORMAL FINDINGS: ICD-10-CM

## 2020-05-24 DIAGNOSIS — Z98.890 OTHER SPECIFIED POSTPROCEDURAL STATES: Chronic | ICD-10-CM

## 2020-07-16 ENCOUNTER — APPOINTMENT (OUTPATIENT)
Dept: CT IMAGING | Facility: IMAGING CENTER | Age: 60
End: 2020-07-16
Payer: COMMERCIAL

## 2020-07-16 ENCOUNTER — OUTPATIENT (OUTPATIENT)
Dept: OUTPATIENT SERVICES | Facility: HOSPITAL | Age: 60
LOS: 1 days | End: 2020-07-16
Payer: COMMERCIAL

## 2020-07-16 DIAGNOSIS — R91.1 SOLITARY PULMONARY NODULE: ICD-10-CM

## 2020-07-16 DIAGNOSIS — Z98.890 OTHER SPECIFIED POSTPROCEDURAL STATES: Chronic | ICD-10-CM

## 2020-07-16 PROCEDURE — 71250 CT THORAX DX C-: CPT

## 2020-07-16 PROCEDURE — 71250 CT THORAX DX C-: CPT | Mod: 26

## 2020-08-31 LAB
COHGB MFR BLDV: 3 %
GAS + CO PNL BLD: NORMAL
HGB BLD-MCNC: 13.9 G/DL
O2HB: 93 %

## 2020-11-27 ENCOUNTER — TRANSCRIPTION ENCOUNTER (OUTPATIENT)
Age: 60
End: 2020-11-27

## 2020-12-21 ENCOUNTER — NON-APPOINTMENT (OUTPATIENT)
Age: 60
End: 2020-12-21

## 2020-12-21 PROBLEM — Z87.09 HISTORY OF PHARYNGITIS: Status: RESOLVED | Noted: 2017-03-03 | Resolved: 2020-12-21

## 2021-03-02 ENCOUNTER — LABORATORY RESULT (OUTPATIENT)
Age: 61
End: 2021-03-02

## 2021-03-02 ENCOUNTER — APPOINTMENT (OUTPATIENT)
Dept: INTERNAL MEDICINE | Facility: CLINIC | Age: 61
End: 2021-03-02
Payer: COMMERCIAL

## 2021-03-02 ENCOUNTER — NON-APPOINTMENT (OUTPATIENT)
Age: 61
End: 2021-03-02

## 2021-03-02 VITALS
TEMPERATURE: 96.5 F | WEIGHT: 188 LBS | OXYGEN SATURATION: 98 % | BODY MASS INDEX: 31.32 KG/M2 | HEART RATE: 70 BPM | HEIGHT: 65 IN

## 2021-03-02 VITALS — SYSTOLIC BLOOD PRESSURE: 110 MMHG | DIASTOLIC BLOOD PRESSURE: 80 MMHG

## 2021-03-02 LAB
COHGB MFR BLDV: 2.9 %
GAS + CO PNL BLD: NORMAL
HGB BLD-MCNC: 13.1 G/DL
O2HB: 80 %
SAVE SPECIMEN: NORMAL

## 2021-03-02 PROCEDURE — 99396 PREV VISIT EST AGE 40-64: CPT | Mod: 25

## 2021-03-02 PROCEDURE — 99072 ADDL SUPL MATRL&STAF TM PHE: CPT

## 2021-03-02 PROCEDURE — 93000 ELECTROCARDIOGRAM COMPLETE: CPT

## 2021-03-02 PROCEDURE — 36415 COLL VENOUS BLD VENIPUNCTURE: CPT

## 2021-03-02 RX ORDER — AZITHROMYCIN 250 MG/1
250 TABLET, FILM COATED ORAL
Qty: 1 | Refills: 0 | Status: DISCONTINUED | COMMUNITY
Start: 2019-12-23 | End: 2021-03-02

## 2021-03-02 NOTE — ASSESSMENT
[FreeTextEntry1] : Problems\par History of smoking\par Pulmonary nodule\par Mild mitral regurgitation\par Mood disorder\par Assessment\par The patient's vital signs and physical examination were normal.  Noncontrast CT was ordered to assess her pulmonary nodule.  In addition she was advised of the need for an echocardiogram

## 2021-03-02 NOTE — HISTORY OF PRESENT ILLNESS
[de-identified] : This is a 60-year-old patient with a history of fatigue, mild mitral regurg, pulmonary nodule who is here today for her annual well examination.

## 2021-03-02 NOTE — PHYSICAL EXAM
[Declined Breast Exam] : declined breast exam  [Declined Rectal Exam] : declined rectal exam [Normal] : no rash [de-identified] : Grade 1/6 systolic ejection murmur

## 2021-03-02 NOTE — HEALTH RISK ASSESSMENT
[] : No [No] : No [No falls in past year] : Patient reported no falls in the past year [0] : 2) Feeling down, depressed, or hopeless: Not at all (0) [[id number]] : 0 [Fully functional (bathing, dressing, toileting, transferring, walking, feeding)] : Fully functional (bathing, dressing, toileting, transferring, walking, feeding) [Fully functional (using the telephone, shopping, preparing meals, housekeeping, doing laundry, using] : Fully functional and needs no help or supervision to perform IADLs (using the telephone, shopping, preparing meals, housekeeping, doing laundry, using transportation, managing medications and managing finances)

## 2021-03-03 LAB
25(OH)D3 SERPL-MCNC: 58.6 NG/ML
ALBUMIN SERPL ELPH-MCNC: 4.5 G/DL
ALP BLD-CCNC: 112 U/L
ALT SERPL-CCNC: 32 U/L
ANION GAP SERPL CALC-SCNC: 13 MMOL/L
APPEARANCE: ABNORMAL
AST SERPL-CCNC: 24 U/L
BACTERIA: NEGATIVE
BASOPHILS # BLD AUTO: 0.03 K/UL
BASOPHILS NFR BLD AUTO: 0.3 %
BILIRUB SERPL-MCNC: <0.2 MG/DL
BILIRUBIN URINE: NEGATIVE
BLOOD URINE: NEGATIVE
BUN SERPL-MCNC: 13 MG/DL
CALCIUM SERPL-MCNC: 10.4 MG/DL
CHLORIDE SERPL-SCNC: 105 MMOL/L
CHOLEST SERPL-MCNC: 196 MG/DL
CO2 SERPL-SCNC: 24 MMOL/L
COLOR: YELLOW
CREAT SERPL-MCNC: 0.94 MG/DL
EOSINOPHIL # BLD AUTO: 0.1 K/UL
EOSINOPHIL NFR BLD AUTO: 1 %
ESTIMATED AVERAGE GLUCOSE: 108 MG/DL
FOLATE SERPL-MCNC: >20 NG/ML
GLUCOSE QUALITATIVE U: NEGATIVE
GLUCOSE SERPL-MCNC: 87 MG/DL
HBA1C MFR BLD HPLC: 5.4 %
HCT VFR BLD CALC: 39.4 %
HDLC SERPL-MCNC: 63 MG/DL
HGB BLD-MCNC: 12.5 G/DL
HYALINE CASTS: 4 /LPF
IMM GRANULOCYTES NFR BLD AUTO: 0.3 %
KETONES URINE: NEGATIVE
LDLC SERPL CALC-MCNC: 104 MG/DL
LEUKOCYTE ESTERASE URINE: ABNORMAL
LYMPHOCYTES # BLD AUTO: 2.37 K/UL
LYMPHOCYTES NFR BLD AUTO: 23.6 %
MAN DIFF?: NORMAL
MCHC RBC-ENTMCNC: 31 PG
MCHC RBC-ENTMCNC: 31.7 GM/DL
MCV RBC AUTO: 97.8 FL
MICROSCOPIC-UA: NORMAL
MONOCYTES # BLD AUTO: 0.9 K/UL
MONOCYTES NFR BLD AUTO: 9 %
NEUTROPHILS # BLD AUTO: 6.61 K/UL
NEUTROPHILS NFR BLD AUTO: 65.8 %
NITRITE URINE: NEGATIVE
NONHDLC SERPL-MCNC: 133 MG/DL
PH URINE: 6
PLATELET # BLD AUTO: 335 K/UL
POTASSIUM SERPL-SCNC: 4.8 MMOL/L
PROT SERPL-MCNC: 6.9 G/DL
PROTEIN URINE: NORMAL
RBC # BLD: 4.03 M/UL
RBC # FLD: 14.6 %
RED BLOOD CELLS URINE: 8 /HPF
SAVE SPECIMEN: NORMAL
SODIUM SERPL-SCNC: 142 MMOL/L
SPECIFIC GRAVITY URINE: 1.03
SQUAMOUS EPITHELIAL CELLS: 12 /HPF
T3RU NFR SERPL: 1.1 TBI
T4 SERPL-MCNC: 8 UG/DL
TRIGL SERPL-MCNC: 146 MG/DL
TSH SERPL-ACNC: 1.51 UIU/ML
URATE SERPL-MCNC: 2.8 MG/DL
UROBILINOGEN URINE: NORMAL
VIT B12 SERPL-MCNC: 822 PG/ML
WBC # FLD AUTO: 10.04 K/UL
WHITE BLOOD CELLS URINE: 8 /HPF

## 2021-03-05 LAB — BACTERIA UR CULT: NORMAL

## 2021-03-11 ENCOUNTER — APPOINTMENT (OUTPATIENT)
Dept: INTERNAL MEDICINE | Facility: CLINIC | Age: 61
End: 2021-03-11
Payer: COMMERCIAL

## 2021-03-11 PROCEDURE — 99072 ADDL SUPL MATRL&STAF TM PHE: CPT

## 2021-03-11 PROCEDURE — 77080 DXA BONE DENSITY AXIAL: CPT

## 2021-03-18 ENCOUNTER — NON-APPOINTMENT (OUTPATIENT)
Age: 61
End: 2021-03-18

## 2021-05-13 ENCOUNTER — APPOINTMENT (OUTPATIENT)
Dept: CT IMAGING | Facility: IMAGING CENTER | Age: 61
End: 2021-05-13

## 2021-05-13 DIAGNOSIS — Z77.29 CONTACT WITH AND (SUSPECTED) EXPOSURE TO OTHER HAZARDOUS SUBSTANCES: ICD-10-CM

## 2021-05-18 LAB
COHGB MFR BLDV: 1.4 %
GAS + CO PNL BLD: NORMAL
HGB BLD-MCNC: 12.5 G/DL
O2HB: 97

## 2021-08-12 ENCOUNTER — TRANSCRIPTION ENCOUNTER (OUTPATIENT)
Age: 61
End: 2021-08-12

## 2021-09-02 NOTE — ASU DISCHARGE PLAN (ADULT/PEDIATRIC) - ***IN THE EVENT THAT YOU DEVELOP A COMPLICATION AND YOU ARE UNABLE TO REACH YOUR OWN PHYSICIAN, YOU MAY CONTACT:
[Labia Majora] : normal [Labia Minora] : normal [Normal] : normal [Uterine Adnexae] : normal Statement Selected

## 2021-11-21 ENCOUNTER — TRANSCRIPTION ENCOUNTER (OUTPATIENT)
Age: 61
End: 2021-11-21

## 2021-12-10 NOTE — ED PROVIDER NOTE - CPE EDP CARDIAC NORM
Presents here today for his third and final Hepatitis B vaccine. Temp 98.2,  Hepatitis B Vaccine ( Recombinant ), Engerix,  0.5mls given Im to his left deltoid.  No adverse reactions noted.  NDC #, 30270-996-68, Lot #, [id number], exp date, 04/29/2023.   normal...

## 2021-12-20 ENCOUNTER — NON-APPOINTMENT (OUTPATIENT)
Age: 61
End: 2021-12-20

## 2022-03-03 ENCOUNTER — LABORATORY RESULT (OUTPATIENT)
Age: 62
End: 2022-03-03

## 2022-03-03 ENCOUNTER — NON-APPOINTMENT (OUTPATIENT)
Age: 62
End: 2022-03-03

## 2022-03-03 ENCOUNTER — APPOINTMENT (OUTPATIENT)
Dept: INTERNAL MEDICINE | Facility: CLINIC | Age: 62
End: 2022-03-03
Payer: COMMERCIAL

## 2022-03-03 VITALS — BODY MASS INDEX: 32.65 KG/M2 | WEIGHT: 196 LBS | HEIGHT: 65 IN

## 2022-03-03 VITALS — SYSTOLIC BLOOD PRESSURE: 110 MMHG | DIASTOLIC BLOOD PRESSURE: 70 MMHG

## 2022-03-03 DIAGNOSIS — L85.3 XEROSIS CUTIS: ICD-10-CM

## 2022-03-03 DIAGNOSIS — H69.90 UNSPECIFIED EUSTACHIAN TUBE DISORDER, UNSPECIFIED EAR: ICD-10-CM

## 2022-03-03 PROCEDURE — 99396 PREV VISIT EST AGE 40-64: CPT | Mod: 25

## 2022-03-03 PROCEDURE — 93000 ELECTROCARDIOGRAM COMPLETE: CPT

## 2022-03-03 PROCEDURE — 36415 COLL VENOUS BLD VENIPUNCTURE: CPT

## 2022-03-03 NOTE — HISTORY OF PRESENT ILLNESS
[de-identified] : This is a 61 year-old patient with a history of fatigue, mild mitral regurg, pulmonary nodule who is here today for her annual well examination.

## 2022-03-03 NOTE — PHYSICAL EXAM
[Declined Breast Exam] : declined breast exam  [Declined Rectal Exam] : declined rectal exam [Normal] : no rash [de-identified] : Grade 1/6 systolic ejection murmur

## 2022-03-03 NOTE — ASSESSMENT
[FreeTextEntry1] : Problems\par History of smoking\par Pulmonary nodule\par Mild mitral regurgitation\par Mood disorder\par Assessment\par The patient's vital signs and physical examination were normal.  Noncontrast CT was ordered to assess her pulmonary nodule.  In addition she was advised of the need for an echocardiogram and mammogram

## 2022-03-03 NOTE — HEALTH RISK ASSESSMENT
[No] : No [No falls in past year] : Patient reported no falls in the past year [0] : 2) Feeling down, depressed, or hopeless: Not at all (0) [PHQ-2 Negative - No further assessment needed] : PHQ-2 Negative - No further assessment needed [Fully functional (bathing, dressing, toileting, transferring, walking, feeding)] : Fully functional (bathing, dressing, toileting, transferring, walking, feeding) [Fully functional (using the telephone, shopping, preparing meals, housekeeping, doing laundry, using] : Fully functional and needs no help or supervision to perform IADLs (using the telephone, shopping, preparing meals, housekeeping, doing laundry, using transportation, managing medications and managing finances) [[id number]] : 0

## 2022-03-06 LAB
25(OH)D3 SERPL-MCNC: 54.1 NG/ML
ALBUMIN SERPL ELPH-MCNC: 4.5 G/DL
ALP BLD-CCNC: 111 U/L
ALT SERPL-CCNC: 13 U/L
ANION GAP SERPL CALC-SCNC: 12 MMOL/L
APPEARANCE: CLEAR
AST SERPL-CCNC: 17 U/L
BACTERIA: NEGATIVE
BASOPHILS # BLD AUTO: 0.02 K/UL
BASOPHILS NFR BLD AUTO: 0.3 %
BILIRUB SERPL-MCNC: 0.2 MG/DL
BILIRUBIN URINE: NEGATIVE
BLOOD URINE: NEGATIVE
BUN SERPL-MCNC: 10 MG/DL
CALCIUM SERPL-MCNC: 10.1 MG/DL
CHLORIDE SERPL-SCNC: 104 MMOL/L
CHOLEST SERPL-MCNC: 210 MG/DL
CO2 SERPL-SCNC: 24 MMOL/L
COLOR: YELLOW
CREAT SERPL-MCNC: 0.64 MG/DL
EGFR: 101 ML/MIN/1.73M2
EOSINOPHIL # BLD AUTO: 0.07 K/UL
EOSINOPHIL NFR BLD AUTO: 1 %
ESTIMATED AVERAGE GLUCOSE: 117 MG/DL
FERRITIN SERPL-MCNC: 106 NG/ML
FOLATE SERPL-MCNC: >20 NG/ML
GLUCOSE QUALITATIVE U: NEGATIVE
GLUCOSE SERPL-MCNC: 83 MG/DL
HBA1C MFR BLD HPLC: 5.7 %
HCT VFR BLD CALC: 39.5 %
HDLC SERPL-MCNC: 66 MG/DL
HGB BLD-MCNC: 12.2 G/DL
HYALINE CASTS: 0 /LPF
IMM GRANULOCYTES NFR BLD AUTO: 0.1 %
KETONES URINE: NEGATIVE
LDLC SERPL CALC-MCNC: 130 MG/DL
LEUKOCYTE ESTERASE URINE: NEGATIVE
LYMPHOCYTES # BLD AUTO: 2.23 K/UL
LYMPHOCYTES NFR BLD AUTO: 31.9 %
MAN DIFF?: NORMAL
MCHC RBC-ENTMCNC: 29.6 PG
MCHC RBC-ENTMCNC: 30.9 GM/DL
MCV RBC AUTO: 95.9 FL
MICROSCOPIC-UA: NORMAL
MONOCYTES # BLD AUTO: 0.7 K/UL
MONOCYTES NFR BLD AUTO: 10 %
NEUTROPHILS # BLD AUTO: 3.97 K/UL
NEUTROPHILS NFR BLD AUTO: 56.7 %
NITRITE URINE: NEGATIVE
NONHDLC SERPL-MCNC: 144 MG/DL
PH URINE: 6.5
PLATELET # BLD AUTO: 341 K/UL
POTASSIUM SERPL-SCNC: 4.4 MMOL/L
PROT SERPL-MCNC: 6.9 G/DL
PROTEIN URINE: NEGATIVE
RBC # BLD: 4.12 M/UL
RBC # FLD: 14.3 %
RED BLOOD CELLS URINE: 7 /HPF
SODIUM SERPL-SCNC: 140 MMOL/L
SPECIFIC GRAVITY URINE: 1.02
SQUAMOUS EPITHELIAL CELLS: 1 /HPF
T3RU NFR SERPL: 1.1 TBI
T4 SERPL-MCNC: 8.7 UG/DL
TRIGL SERPL-MCNC: 70 MG/DL
TSH SERPL-ACNC: 1.63 UIU/ML
URATE SERPL-MCNC: 3.3 MG/DL
UROBILINOGEN URINE: NORMAL
VIT B12 SERPL-MCNC: 858 PG/ML
WBC # FLD AUTO: 7 K/UL
WHITE BLOOD CELLS URINE: 0 /HPF

## 2022-03-21 ENCOUNTER — APPOINTMENT (OUTPATIENT)
Dept: CT IMAGING | Facility: IMAGING CENTER | Age: 62
End: 2022-03-21
Payer: COMMERCIAL

## 2022-03-21 ENCOUNTER — OUTPATIENT (OUTPATIENT)
Dept: OUTPATIENT SERVICES | Facility: HOSPITAL | Age: 62
LOS: 1 days | End: 2022-03-21
Payer: COMMERCIAL

## 2022-03-21 DIAGNOSIS — Z00.8 ENCOUNTER FOR OTHER GENERAL EXAMINATION: ICD-10-CM

## 2022-03-21 DIAGNOSIS — Z98.890 OTHER SPECIFIED POSTPROCEDURAL STATES: Chronic | ICD-10-CM

## 2022-03-21 PROCEDURE — 71250 CT THORAX DX C-: CPT

## 2022-03-21 PROCEDURE — 71250 CT THORAX DX C-: CPT | Mod: 26

## 2022-04-13 ENCOUNTER — NON-APPOINTMENT (OUTPATIENT)
Age: 62
End: 2022-04-13

## 2022-04-13 ENCOUNTER — RESULT REVIEW (OUTPATIENT)
Age: 62
End: 2022-04-13

## 2022-04-16 ENCOUNTER — APPOINTMENT (OUTPATIENT)
Dept: MRI IMAGING | Facility: IMAGING CENTER | Age: 62
End: 2022-04-16
Payer: COMMERCIAL

## 2022-04-16 ENCOUNTER — OUTPATIENT (OUTPATIENT)
Dept: OUTPATIENT SERVICES | Facility: HOSPITAL | Age: 62
LOS: 1 days | End: 2022-04-16
Payer: COMMERCIAL

## 2022-04-16 DIAGNOSIS — Z98.890 OTHER SPECIFIED POSTPROCEDURAL STATES: Chronic | ICD-10-CM

## 2022-04-16 DIAGNOSIS — Z00.8 ENCOUNTER FOR OTHER GENERAL EXAMINATION: ICD-10-CM

## 2022-04-16 PROCEDURE — 73221 MRI JOINT UPR EXTREM W/O DYE: CPT | Mod: 26,RT

## 2022-04-16 PROCEDURE — 73221 MRI JOINT UPR EXTREM W/O DYE: CPT

## 2022-05-26 ENCOUNTER — OUTPATIENT (OUTPATIENT)
Dept: OUTPATIENT SERVICES | Facility: HOSPITAL | Age: 62
LOS: 1 days | End: 2022-05-26
Payer: COMMERCIAL

## 2022-05-26 ENCOUNTER — APPOINTMENT (OUTPATIENT)
Dept: UROLOGY | Facility: CLINIC | Age: 62
End: 2022-05-26
Payer: COMMERCIAL

## 2022-05-26 VITALS
DIASTOLIC BLOOD PRESSURE: 76 MMHG | BODY MASS INDEX: 33.66 KG/M2 | TEMPERATURE: 98 F | HEIGHT: 65 IN | WEIGHT: 202 LBS | SYSTOLIC BLOOD PRESSURE: 115 MMHG | HEART RATE: 65 BPM

## 2022-05-26 DIAGNOSIS — Z98.890 OTHER SPECIFIED POSTPROCEDURAL STATES: Chronic | ICD-10-CM

## 2022-05-26 PROCEDURE — 99203 OFFICE O/P NEW LOW 30 MIN: CPT | Mod: 25

## 2022-05-26 PROCEDURE — 52000 CYSTOURETHROSCOPY: CPT

## 2022-05-28 LAB
APPEARANCE: CLEAR
BACTERIA UR CULT: NORMAL
BACTERIA: NEGATIVE
BILIRUBIN URINE: NEGATIVE
BLOOD URINE: NEGATIVE
COLOR: YELLOW
GLUCOSE QUALITATIVE U: NEGATIVE
HYALINE CASTS: 3 /LPF
KETONES URINE: NEGATIVE
LEUKOCYTE ESTERASE URINE: ABNORMAL
MICROSCOPIC-UA: NORMAL
NITRITE URINE: NEGATIVE
PH URINE: 6
PROTEIN URINE: NORMAL
RED BLOOD CELLS URINE: 4 /HPF
SPECIFIC GRAVITY URINE: 1.03
SQUAMOUS EPITHELIAL CELLS: 5 /HPF
UROBILINOGEN URINE: NORMAL
WHITE BLOOD CELLS URINE: 4 /HPF

## 2022-06-01 NOTE — H&P PST ADULT - NS PRO REFERRAL CMGT
PEDRO AMBULATORY ENCOUNTER  ORTHOPEDIC CONSULT NOTE    I had the pleasure of seeing Chon Fermin at the request of Dejon Escobar MD for a consultation regarding Foot (left foot) and Office Visit   .    ASSESSMENT/PLAN:    Chon was seen today for foot and office visit.    Diagnoses and all orders for this visit:    Low-trauma fracture  -     VITAMIN D -25 HYDROXY; Future  -     MESH TOP POST OP SHOE EACH    Closed nondisplaced fracture of navicular bone of left foot, initial encounter  -     VITAMIN D -25 HYDROXY; Future  -     MESH TOP POST OP SHOE EACH    Closed fracture of base of fifth metatarsal bone of left foot, initial encounter  -     VITAMIN D -25 HYDROXY; Future  -     MESH TOP POST OP SHOE EACH    Closed nondisplaced fracture of cuboid of left foot, initial encounter  -     VITAMIN D -25 HYDROXY; Future  -     MESH TOP POST OP SHOE EACH    Diabetic polyneuropathy associated with type 2 diabetes mellitus (CMS/[facility])  -     SERVICE TO PODIATRY    Onychomycosis of great toe  -     SERVICE TO PODIATRY    The Lewis is a 59-year-old male with diabetic polyneuropathy the with sensation loss of the toes of left foot and a low-impact fracture causing the above fractures mentioned.  For this reason would check his vitamin-D.  Still having moderate pain with swelling and would advise a postop shoe to allow the bones to heal as well as possible, likely for the next 4 weeks to 6 weeks will need the postop shoe.  For his diabetic polyneuropathy and onychomycosis of the toes I would suggest establishing with Podiatry.  Discussed fracture care below and will see him back in 4 weeks for repeat imaging  Recommendations:   1. Post op shoe for 4-6 weeks.   2. ROM exercises of ankle and foot non-weight bearing.   3. DVT warnings: move lower extremities every hour for at least 5 minutes unless sleeping   4. Elevation above heart level, compression, icing and tylenol (up to 4000 mg daily max for short term) for pain and  swelling.   5. Avoid NSAIDs with fractures as much as able.  6. 2000 IU Vit D and 1200mg Ca daily for bone healing.   7. Work note supplied  8. Get vitamin D level drawn  9. Return to clinic in 4 weeks    Fracture care billed for today's visit.    Return in about 4 weeks (around 6/29/2022).    Instructions provided as documented in the after visit summary.  35 minutes spent on pre charting, in-person evaluation and documentation as well as communication with other providers  The patient and sister(s) indicated understanding of the diagnosis and agreed with the plan of care.      Thank you Dejon Escobar MD for requesting my participation in your patient's care, a copy of this office note was sent to your office.     CHIEF COMPLAINT/HISTORY OF PRESENT ILLNESS:    Chon Fermin is a pleasant 59 year old male with a baseline history of type 2 diabetes with diabetic neuropathy without long-term use of insulin who presents with left foot pain. 10 days ago he was pushing grocery carts at [employer] when he pushed off his left foot, felt a pop, and mostly had pain over the instep but had some soreness and swelling subsequently across the entire foot. He had pain with ambulation but was able to continue walking for the next few days while limping.  Continue to have swelling and soreness so he went in to see his PCP Dr. Escobar a week later, certainly sounded like it plantar fascia strain by his note.  Did obtain x-rays at that visit which showed fractures of the foot, 1 at the base of the 5th metatarsal, likely medial navicular, and likely avulsion lateral cuboid.  Was referred here for further evaluation.   Today reports pain across the entire dorsum of the midfoot as well as the medial instep.  Forefoot is non tender, but he has baseline sensation loss in the toes especially the left 1st toe.  Had lost his 1st toenail and is growing back slowly, has noticed some skin discoloration of the 1st toe as well, which is a chronic  issue.  Is not following with Podiatry yet.    PROBLEM LIST:    does not have any pertinent problems on file.    MEDICATIONS:    has a current medication list which includes the following prescription(s): glimepiride, metformin, empagliflozin, atorvastatin, famotidine, fluticasone-salmeterol, ventolin hfa, prevident 5000 plus, ibuprofen, blood glucose, blood glucose monitor system, and [START ON 6/6/2022] cholecalciferol.    ALLERGIES:    is allergic to penicillins.    PAST MEDICAL HISTORY:     has a past medical history of Diabetes mellitus (CMS/[facility]), Essential (primary) hypertension, and Pyelonephritis.    He has no past medical history of MRSA (methicillin resistant Staphylococcus aureus), PONV (postoperative nausea and vomiting), or Sleep apnea.    SURGICAL HISTORY:     has a past surgical history that includes Colonoscopy (01/29/2019); Esophagogastroduodenoscopy (03/13/2019); Esophagogastroduodenoscopy (03/13/2019); and debridement, subcutaneious tissue, first 20 sq or less (Left, 06/22/2020).    FAMILY HISTORY:    family history includes Dementia/Alzheimers in his father; Diabetes in his mother; Heart disease in his father and mother; Patient is unaware of any medical problems in his sister.    SOCIAL HISTORY:     reports that he quit smoking about 11 years ago. He has never used smokeless tobacco. He reports previous alcohol use. He reports that he does not use drugs.    REVIEW OF SYSTEMS:    All other systems are reviewed and are negative except as documented in the History of Present Illness.     OBJECTIVE:  PHYSICAL EXAM:    Vitals:    Visit Vitals  Pulse 84   Resp 20   SpO2 96%      General Appearance: Appears healthy and in no acute distress, A&Ox3   Head: no ear/nose masses.  Skin:  no rash of bilateral lower extremities.  Some skin discoloration of the 1st great toe with toenail growing back.  Bilateral onychomycosis present    BILATERAL FEET:  The right ankle and foot have no swelling. There is no  erythema. There is no fluctuance about the foot/ankle.    Hindfoot alignment: neutral.  Arch: low.    The left ankle and foot have moderate swelling. There is no erythema. There is no fluctuance about the foot/ankle.    Gait: Patient is able to bear weight at the time of examination.   Hindfoot alignment: neutral.  Arch: low.    TENDERNESS:  On the right there is no tenderness to palpation.    On the left there is tenderness to palpation of the  talar dome, proximal 5th metatarsal region and lateral plantar surface, Medial aspect of the 1st metatarsal, navicular, and cuboid    RANGE OF MOTION:  Right:  There is no gross deformity at the hallux and no pain in the mid range of motion  There is a palpable, 2+ dorsalis pedis pulse and the toes have brisk capillary refill. Sensation is intact to light touch in superficial peroneal, deep peroneal and tibial nerve distribution. The patient is able to actively plantarflex, dorsiflex, invert and madonna the hindfoot.    Left:  No pain in the mid range of motion  Sensation loss at the 1st toe entirely and plantar surface of the rest of the toes  There is a palpable, 2+ dorsalis pedis pulse but the toes have delayed cap refill.  Sensation is absent to light touch in all toes. The patient is able to actively plantarflex, dorsiflex, invert and madonna the hindfoot.     RANGE OF MOTION RIGHT LEFT    ACTIVE ACTIVE   Ankle dorsiflexion 20° 15°   Ankle plantar flexion 50° 30°   Subtalar inversion 15° 10°   Subtalar eversion 10° 5°   Able to minimally flex and extend the toes       IMAGING STUDIES:    X-ray left foot 05/31/2022:  Small fracture of the base of the 5th metatarsal.  Medial navicular fracture.  Avulsion of the lateral cuboid.   None

## 2022-06-02 ENCOUNTER — RESULT REVIEW (OUTPATIENT)
Age: 62
End: 2022-06-02

## 2022-06-17 ENCOUNTER — TRANSCRIPTION ENCOUNTER (OUTPATIENT)
Age: 62
End: 2022-06-17

## 2022-06-23 LAB
ALBUMIN SERPL ELPH-MCNC: 4.4 G/DL
ALP BLD-CCNC: 106 U/L
ALT SERPL-CCNC: 14 U/L
ANION GAP SERPL CALC-SCNC: 12 MMOL/L
AST SERPL-CCNC: 22 U/L
BILIRUB SERPL-MCNC: 0.3 MG/DL
BUN SERPL-MCNC: 13 MG/DL
CALCIUM SERPL-MCNC: 9.6 MG/DL
CHLORIDE SERPL-SCNC: 105 MMOL/L
CHOLEST SERPL-MCNC: 214 MG/DL
CO2 SERPL-SCNC: 23 MMOL/L
CREAT SERPL-MCNC: 0.62 MG/DL
EGFR: 101 ML/MIN/1.73M2
GLUCOSE SERPL-MCNC: 87 MG/DL
HDLC SERPL-MCNC: 65 MG/DL
LDLC SERPL CALC-MCNC: 136 MG/DL
NONHDLC SERPL-MCNC: 149 MG/DL
POTASSIUM SERPL-SCNC: 4.6 MMOL/L
PROT SERPL-MCNC: 7.1 G/DL
SODIUM SERPL-SCNC: 140 MMOL/L
TRIGL SERPL-MCNC: 68 MG/DL

## 2022-06-27 DIAGNOSIS — R31.29 OTHER MICROSCOPIC HEMATURIA: ICD-10-CM

## 2022-07-05 ENCOUNTER — NON-APPOINTMENT (OUTPATIENT)
Age: 62
End: 2022-07-05

## 2022-07-18 ENCOUNTER — NON-APPOINTMENT (OUTPATIENT)
Age: 62
End: 2022-07-18

## 2022-07-18 ENCOUNTER — APPOINTMENT (OUTPATIENT)
Dept: CARDIOLOGY | Facility: CLINIC | Age: 62
End: 2022-07-18

## 2022-07-18 VITALS
WEIGHT: 203 LBS | HEART RATE: 66 BPM | BODY MASS INDEX: 33.78 KG/M2 | SYSTOLIC BLOOD PRESSURE: 134 MMHG | OXYGEN SATURATION: 97 % | DIASTOLIC BLOOD PRESSURE: 70 MMHG

## 2022-07-18 VITALS — DIASTOLIC BLOOD PRESSURE: 76 MMHG | SYSTOLIC BLOOD PRESSURE: 124 MMHG

## 2022-07-18 PROCEDURE — 99244 OFF/OP CNSLTJ NEW/EST MOD 40: CPT

## 2022-07-18 PROCEDURE — 93000 ELECTROCARDIOGRAM COMPLETE: CPT

## 2022-07-18 NOTE — HISTORY OF PRESENT ILLNESS
[FreeTextEntry1] : Dear Mojgan,\par Thank you for referring her for cardiovascular evaluation.  She is a 62-year-old with a history of smoking and high cholesterol who was seen in your office for routine evaluation.  She noted some exertional dyspnea over the past 2 months, particularly walking up 1 flight of stairs at work without any associated chest pain.  She quit smoking in December 2021 and reports increasing weight gain (approximately 40 pounds) since then.\par She has no history of coronary artery disease, diabetes mellitus, hypertension or history of congestive heart failure or renal failure.\par She had a screening CT scan which I reviewed and there is very minimal aortic arch calcification.  No coronary calcification is seen (though this is not a dedicated study for this purpose).\par Recent blood work showed an elevated LDL that had been normal in the past.\par She reports snoring.

## 2022-07-18 NOTE — DISCUSSION/SUMMARY
[FreeTextEntry1] : She is a 62-year-old former smoker who has increasing shortness of breath with exertion and recent weight gain.\par Her ECG shows normal sinus rhythm with no acute ST segment abnormalities (decreased voltage (.\par We discussed possibilities of a cardiac source as the reason for her exertional dyspnea.  I have scheduled her for an echocardiogram to exclude any structural or valvular disease as a source of her dyspnea and an exercise stress test to exclude ischemic heart disease.\par Most likely cause for symptoms is the recent 40 pound weight gain.  We discussed ways to reduce her weight including overall reduction in her caloric intake, particularly by not drinking any calories and reducing the "garbage" that she eats.\par I advised her to set a goal of losing 1 pound per week.\par If no abnormalities are found on her cardiac testing I will encourage her to begin routine aerobic activity including 30 minutes of aerobic activity every other day.\par She should repeat her LDL in 6 months at which time if she remains elevated might consider medical therapy.\par  [EKG obtained to assist in diagnosis and management of assessed problem(s)] : EKG obtained to assist in diagnosis and management of assessed problem(s)

## 2022-07-18 NOTE — PHYSICAL EXAM
[Well Developed] : well developed [Well Nourished] : well nourished [Normal Conjunctiva] : normal conjunctiva [Normal Venous Pressure] : normal venous pressure [No Carotid Bruit] : no carotid bruit [Normal S1, S2] : normal S1, S2 [No Murmur] : no murmur [No Rub] : no rub [No Gallop] : no gallop [Clear Lung Fields] : clear lung fields [Good Air Entry] : good air entry [No Respiratory Distress] : no respiratory distress  [Soft] : abdomen soft [Non Tender] : non-tender [No Masses/organomegaly] : no masses/organomegaly [Normal Bowel Sounds] : normal bowel sounds [Normal Gait] : normal gait [No Edema] : no edema [No Cyanosis] : no cyanosis [No Clubbing] : no clubbing [No Varicosities] : no varicosities [No Rash] : no rash [No Skin Lesions] : no skin lesions [Moves all extremities] : moves all extremities [No Focal Deficits] : no focal deficits [Normal Speech] : normal speech [Alert and Oriented] : alert and oriented [Normal memory] : normal memory [de-identified] : Obese woman in no acute distress

## 2022-08-24 ENCOUNTER — APPOINTMENT (OUTPATIENT)
Dept: CARDIOLOGY | Facility: CLINIC | Age: 62
End: 2022-08-24

## 2022-08-24 PROCEDURE — 93015 CV STRESS TEST SUPVJ I&R: CPT

## 2022-08-24 PROCEDURE — 93306 TTE W/DOPPLER COMPLETE: CPT

## 2022-09-27 ENCOUNTER — LABORATORY RESULT (OUTPATIENT)
Age: 62
End: 2022-09-27

## 2022-09-29 LAB
ALBUMIN SERPL ELPH-MCNC: 4.6 G/DL
ALP BLD-CCNC: 108 U/L
ALT SERPL-CCNC: 14 U/L
ANION GAP SERPL CALC-SCNC: 11 MMOL/L
AST SERPL-CCNC: 18 U/L
BASOPHILS # BLD AUTO: 0.03 K/UL
BASOPHILS NFR BLD AUTO: 0.4 %
BILIRUB SERPL-MCNC: <0.2 MG/DL
BUN SERPL-MCNC: 17 MG/DL
CALCIUM SERPL-MCNC: 10.1 MG/DL
CHLORIDE SERPL-SCNC: 104 MMOL/L
CHOLEST SERPL-MCNC: 217 MG/DL
CO2 SERPL-SCNC: 26 MMOL/L
CREAT SERPL-MCNC: 0.69 MG/DL
EGFR: 99 ML/MIN/1.73M2
EOSINOPHIL # BLD AUTO: 0.1 K/UL
EOSINOPHIL NFR BLD AUTO: 1.3 %
ESTIMATED AVERAGE GLUCOSE: 120 MG/DL
GLUCOSE SERPL-MCNC: 78 MG/DL
HBA1C MFR BLD HPLC: 5.8 %
HCT VFR BLD CALC: 38.7 %
HDLC SERPL-MCNC: 64 MG/DL
HGB BLD-MCNC: 12.5 G/DL
IMM GRANULOCYTES NFR BLD AUTO: 0.1 %
LDLC SERPL CALC-MCNC: 130 MG/DL
LYMPHOCYTES # BLD AUTO: 2.43 K/UL
LYMPHOCYTES NFR BLD AUTO: 32.1 %
MAN DIFF?: NORMAL
MCHC RBC-ENTMCNC: 30.2 PG
MCHC RBC-ENTMCNC: 32.3 GM/DL
MCV RBC AUTO: 93.5 FL
MONOCYTES # BLD AUTO: 0.77 K/UL
MONOCYTES NFR BLD AUTO: 10.2 %
NEUTROPHILS # BLD AUTO: 4.24 K/UL
NEUTROPHILS NFR BLD AUTO: 55.9 %
NONHDLC SERPL-MCNC: 153 MG/DL
PLATELET # BLD AUTO: 371 K/UL
POTASSIUM SERPL-SCNC: 5.7 MMOL/L
PROT SERPL-MCNC: 7.1 G/DL
RBC # BLD: 4.14 M/UL
RBC # FLD: 14.5 %
SODIUM SERPL-SCNC: 141 MMOL/L
TRIGL SERPL-MCNC: 116 MG/DL
WBC # FLD AUTO: 7.58 K/UL

## 2022-10-06 LAB
POTASSIUM SERPL-SCNC: 4.5 MMOL/L
POTASSIUM SERPL-SCNC: 5.2 MMOL/L

## 2022-11-07 ENCOUNTER — NON-APPOINTMENT (OUTPATIENT)
Age: 62
End: 2022-11-07

## 2022-12-05 ENCOUNTER — LABORATORY RESULT (OUTPATIENT)
Age: 62
End: 2022-12-05

## 2022-12-05 ENCOUNTER — APPOINTMENT (OUTPATIENT)
Dept: INTERNAL MEDICINE | Facility: CLINIC | Age: 62
End: 2022-12-05

## 2022-12-05 VITALS — DIASTOLIC BLOOD PRESSURE: 70 MMHG | SYSTOLIC BLOOD PRESSURE: 120 MMHG

## 2022-12-05 VITALS — HEIGHT: 65 IN | WEIGHT: 195 LBS | BODY MASS INDEX: 32.49 KG/M2

## 2022-12-05 DIAGNOSIS — J40 BRONCHITIS, NOT SPECIFIED AS ACUTE OR CHRONIC: ICD-10-CM

## 2022-12-05 PROCEDURE — 99214 OFFICE O/P EST MOD 30 MIN: CPT | Mod: 25

## 2022-12-05 NOTE — ASSESSMENT
[FreeTextEntry1] : Problems\par Abnormal Pap with atypical cells-the patient is scheduled to go back to her gynecologist for further evaluation\par Status post COVID-the patient is still having an occasional cough I advised her to take Mucinex DM as needed for the cough\par Pulmonary nodule-the patient did stop smoking and is not due for repeat CAT scan for another year

## 2022-12-05 NOTE — HISTORY OF PRESENT ILLNESS
[de-identified] : This is a 62-year-old patient who is recovering from COVID infection.  This is manifest by sinusitis and bronchitis.  In addition she recently had a Pap smear by her gynecologist which revealed numerous atypical cells.  She also was a previous smoker and has a pulmonary nodule

## 2022-12-05 NOTE — PHYSICAL EXAM
[Declined Breast Exam] : declined breast exam  [Declined Rectal Exam] : declined rectal exam [Normal] : no rash [de-identified] : Grade 1/6 systolic ejection murmur

## 2022-12-12 ENCOUNTER — APPOINTMENT (OUTPATIENT)
Dept: CT IMAGING | Facility: CLINIC | Age: 62
End: 2022-12-12

## 2022-12-13 ENCOUNTER — OUTPATIENT (OUTPATIENT)
Dept: OUTPATIENT SERVICES | Facility: HOSPITAL | Age: 62
LOS: 1 days | End: 2022-12-13
Payer: SELF-PAY

## 2022-12-13 ENCOUNTER — APPOINTMENT (OUTPATIENT)
Dept: CT IMAGING | Facility: CLINIC | Age: 62
End: 2022-12-13

## 2022-12-13 DIAGNOSIS — E78.00 PURE HYPERCHOLESTEROLEMIA, UNSPECIFIED: ICD-10-CM

## 2022-12-13 DIAGNOSIS — Z98.890 OTHER SPECIFIED POSTPROCEDURAL STATES: Chronic | ICD-10-CM

## 2022-12-13 PROCEDURE — 75571 CT HRT W/O DYE W/CA TEST: CPT

## 2022-12-13 PROCEDURE — 75571 CT HRT W/O DYE W/CA TEST: CPT | Mod: 26

## 2022-12-16 LAB
ALBUMIN SERPL ELPH-MCNC: 4.5 G/DL
ALP BLD-CCNC: 106 U/L
ALT SERPL-CCNC: 8 U/L
ANION GAP SERPL CALC-SCNC: 11 MMOL/L
APPEARANCE: CLEAR
AST SERPL-CCNC: 16 U/L
BACTERIA: NEGATIVE
BASOPHILS # BLD AUTO: 0.03 K/UL
BASOPHILS NFR BLD AUTO: 0.4 %
BILIRUB SERPL-MCNC: 0.2 MG/DL
BILIRUBIN URINE: NEGATIVE
BLOOD URINE: NEGATIVE
BUN SERPL-MCNC: 13 MG/DL
CALCIUM SERPL-MCNC: 10.1 MG/DL
CHLORIDE SERPL-SCNC: 103 MMOL/L
CHOLEST SERPL-MCNC: 233 MG/DL
CO2 SERPL-SCNC: 24 MMOL/L
COLOR: YELLOW
COVID-19 NUCLEOCAPSID  GAM ANTIBODY INTERPRETATION: POSITIVE
COVID-19 SPIKE DOMAIN ANTIBODY INTERPRETATION: POSITIVE
CREAT SERPL-MCNC: 0.61 MG/DL
EGFR: 102 ML/MIN/1.73M2
EOSINOPHIL # BLD AUTO: 0.08 K/UL
EOSINOPHIL NFR BLD AUTO: 1 %
ESTIMATED AVERAGE GLUCOSE: 114 MG/DL
FERRITIN SERPL-MCNC: 162 NG/ML
FOLATE SERPL-MCNC: >20 NG/ML
GLUCOSE QUALITATIVE U: NEGATIVE
GLUCOSE SERPL-MCNC: 104 MG/DL
HBA1C MFR BLD HPLC: 5.6 %
HCT VFR BLD CALC: 38.7 %
HDLC SERPL-MCNC: 66 MG/DL
HGB BLD-MCNC: 12.3 G/DL
HYALINE CASTS: 5 /LPF
IMM GRANULOCYTES NFR BLD AUTO: 0.1 %
KETONES URINE: NEGATIVE
LDLC SERPL CALC-MCNC: 152 MG/DL
LEUKOCYTE ESTERASE URINE: ABNORMAL
LYMPHOCYTES # BLD AUTO: 2.82 K/UL
LYMPHOCYTES NFR BLD AUTO: 35 %
MAN DIFF?: NORMAL
MCHC RBC-ENTMCNC: 30 PG
MCHC RBC-ENTMCNC: 31.8 GM/DL
MCV RBC AUTO: 94.4 FL
MICROSCOPIC-UA: NORMAL
MONOCYTES # BLD AUTO: 0.79 K/UL
MONOCYTES NFR BLD AUTO: 9.8 %
NEUTROPHILS # BLD AUTO: 4.33 K/UL
NEUTROPHILS NFR BLD AUTO: 53.7 %
NITRITE URINE: NEGATIVE
NONHDLC SERPL-MCNC: 167 MG/DL
PH URINE: 6.5
PLATELET # BLD AUTO: 316 K/UL
POTASSIUM SERPL-SCNC: 4.5 MMOL/L
PROT SERPL-MCNC: 7.3 G/DL
PROTEIN URINE: NORMAL
RBC # BLD: 4.1 M/UL
RBC # FLD: 14.5 %
RED BLOOD CELLS URINE: 2 /HPF
SARS-COV-2 AB SERPL IA-ACNC: >250 U/ML
SARS-COV-2 AB SERPL QL IA: 61.6 INDEX
SODIUM SERPL-SCNC: 139 MMOL/L
SPECIFIC GRAVITY URINE: 1.02
SQUAMOUS EPITHELIAL CELLS: 5 /HPF
T3RU NFR SERPL: 1.1 TBI
T4 SERPL-MCNC: 8.5 UG/DL
TRIGL SERPL-MCNC: 76 MG/DL
TSH SERPL-ACNC: 2.53 UIU/ML
URATE SERPL-MCNC: 4.1 MG/DL
UROBILINOGEN URINE: NORMAL
VIT B12 SERPL-MCNC: 688 PG/ML
WBC # FLD AUTO: 8.06 K/UL
WHITE BLOOD CELLS URINE: 6 /HPF

## 2023-01-10 PROBLEM — I34.0 MILD MITRAL INSUFFICIENCY: Status: ACTIVE | Noted: 2019-07-18

## 2023-01-11 ENCOUNTER — APPOINTMENT (OUTPATIENT)
Dept: CARDIOTHORACIC SURGERY | Facility: CLINIC | Age: 63
End: 2023-01-11
Payer: COMMERCIAL

## 2023-01-11 VITALS
OXYGEN SATURATION: 98 % | WEIGHT: 200 LBS | RESPIRATION RATE: 16 BRPM | DIASTOLIC BLOOD PRESSURE: 84 MMHG | HEART RATE: 72 BPM | SYSTOLIC BLOOD PRESSURE: 126 MMHG | TEMPERATURE: 98.6 F | HEIGHT: 66 IN | BODY MASS INDEX: 32.14 KG/M2

## 2023-01-11 DIAGNOSIS — I34.0 NONRHEUMATIC MITRAL (VALVE) INSUFFICIENCY: ICD-10-CM

## 2023-01-11 PROCEDURE — 99203 OFFICE O/P NEW LOW 30 MIN: CPT

## 2023-01-12 RX ORDER — ECONAZOLE NITRATE 10 MG/G
1 CREAM TOPICAL
Qty: 85 | Refills: 0 | Status: COMPLETED | COMMUNITY
Start: 2016-08-16 | End: 2023-01-12

## 2023-01-12 RX ORDER — SERTRALINE HYDROCHLORIDE 50 MG/1
50 TABLET, FILM COATED ORAL
Qty: 90 | Refills: 0 | Status: COMPLETED | COMMUNITY
Start: 2019-03-31 | End: 2023-01-12

## 2023-01-12 RX ORDER — ASCORBIC ACID 500 MG
500 TABLET ORAL DAILY
Refills: 0 | Status: ACTIVE | COMMUNITY
Start: 2023-01-12

## 2023-01-12 RX ORDER — ELECTROLYTES/DEXTROSE
SOLUTION, ORAL ORAL DAILY
Qty: 90 | Refills: 0 | Status: ACTIVE | COMMUNITY
Start: 2023-01-12

## 2023-01-12 RX ORDER — NEOMYCIN AND POLYMYXIN B SULFATES, BACITRACIN ZINC AND HYDROCORTISONE ACETATE 3.5; 10000; 400; 1 MG/G; [USP'U]/G; [USP'U]/G; MG/G
1 OINTMENT OPHTHALMIC
Qty: 1 | Refills: 0 | Status: COMPLETED | COMMUNITY
Start: 2020-04-02 | End: 2023-01-12

## 2023-01-12 RX ORDER — ALPRAZOLAM 0.25 MG/1
0.25 TABLET ORAL EVERY 6 HOURS
Qty: 30 | Refills: 0 | Status: COMPLETED | COMMUNITY
Start: 2019-09-10 | End: 2023-01-12

## 2023-01-12 RX ORDER — ACETAMINOPHEN/DIPHENHYDRAMINE 500MG-25MG
1000 TABLET ORAL
Refills: 0 | Status: ACTIVE | COMMUNITY
Start: 2023-01-12

## 2023-01-12 RX ORDER — AZELASTINE HYDROCHLORIDE 205.5 UG/1
0.15 SPRAY, METERED NASAL
Qty: 30 | Refills: 0 | Status: COMPLETED | COMMUNITY
Start: 2017-01-12 | End: 2023-01-12

## 2023-01-12 RX ORDER — MOMETASONE FUROATE 1 MG/G
0.1 CREAM TOPICAL
Qty: 15 | Refills: 0 | Status: COMPLETED | COMMUNITY
Start: 2018-07-26 | End: 2023-01-12

## 2023-01-12 RX ORDER — FLUOCINOLONE ACETONIDE 0.11 MG/ML
0.01 OIL AURICULAR (OTIC)
Qty: 20 | Refills: 0 | Status: COMPLETED | COMMUNITY
Start: 2018-07-26 | End: 2023-01-12

## 2023-01-12 RX ORDER — OMEPRAZOLE 40 MG/1
40 CAPSULE, DELAYED RELEASE ORAL
Qty: 90 | Refills: 0 | Status: COMPLETED | COMMUNITY
Start: 2018-06-21 | End: 2023-01-12

## 2023-01-12 RX ORDER — RANITIDINE 150 MG/1
150 TABLET ORAL
Qty: 30 | Refills: 3 | Status: COMPLETED | COMMUNITY
Start: 2018-06-14 | End: 2023-01-12

## 2023-01-12 RX ORDER — ZINC SULFATE TAB 220 MG (50 MG ZINC EQUIVALENT) 220 (50 ZN) MG
220 (50 ZN) TAB ORAL DAILY
Refills: 0 | Status: ACTIVE | COMMUNITY
Start: 2023-01-12

## 2023-01-16 NOTE — PHYSICAL EXAM
[General Appearance - Alert] : alert [General Appearance - In No Acute Distress] : in no acute distress [General Appearance - Well Nourished] : well nourished [General Appearance - Well Developed] : well developed [Sclera] : the sclera and conjunctiva were normal [PERRL With Normal Accommodation] : pupils were equal in size, round, and reactive to light [Outer Ear] : the ears and nose were normal in appearance [Hearing Threshold Finger Rub Not Letcher] : hearing was normal [Both Tympanic Membranes Were Examined] : both tympanic membranes were normal [Neck Appearance] : the appearance of the neck was normal [] : no respiratory distress [Respiration, Rhythm And Depth] : normal respiratory rhythm and effort [Auscultation Breath Sounds / Voice Sounds] : lungs were clear to auscultation bilaterally [Apical Impulse] : the apical impulse was normal [Heart Rate And Rhythm] : heart rate was normal and rhythm regular [Heart Sounds] : normal S1 and S2 [Examination Of The Chest] : the chest was normal in appearance [2+] : left 2+ [Breast Appearance] : normal in appearance [Bowel Sounds] : normal bowel sounds [Abdomen Soft] : soft [No CVA Tenderness] : no ~M costovertebral angle tenderness [Abnormal Walk] : normal gait [Involuntary Movements] : no involuntary movements were seen [Skin Color & Pigmentation] : normal skin color and pigmentation [Skin Turgor] : normal skin turgor [No Focal Deficits] : no focal deficits [Oriented To Time, Place, And Person] : oriented to person, place, and time [Impaired Insight] : insight and judgment were intact [Affect] : the affect was normal [Mood] : the mood was normal [Memory Recent] : recent memory was not impaired [Memory Remote] : remote memory was not impaired [FreeTextEntry1] : Deferred

## 2023-01-16 NOTE — DATA REVIEWED
[FreeTextEntry1] : 12/13/22 CT heart revealed The calcium score is 0 Agatston units. Stable aneurysm of the ascending aorta, measuring 4.1 cm.\par \par 3/21/22 No contrast CT chest revealed The heart is normal in size. The mid ascending aorta measures 4.1 cm. No pericardial effusion.\par \par 8/24/22 TTE revealed EF 71%, Mild MR, Normal trileaflet aortic valve, PGR 11 mm hg, No AR, Mild TR, Minimal NV. \par

## 2023-01-16 NOTE — HISTORY OF PRESENT ILLNESS
[FreeTextEntry1] : Ms. GAYTAN is a 62 year old female with  past medical history of known heart murmur for 25 yrs, Hep B as a child (resolved as per patient) , Hyperlipidemia, Former smoker quit in 12/2021 with complaints of dyspnea on exertion especially when climbing stairs for one year and recent weight gain of 40 pounds over the past year  . She went for routine physical check up and her labs were abnormal for cholesterol and she went for CT calcium score which revealed aortic dilatation. She can walk up to 1 block/  2 FOS  without shortness of breath. She uses 2 pillows to sleep. She is referred by Dr. Mojgan Faria for initial evaluation and management for Thoracic aortic aneurysm. \par \par Patient is doing well and denies recent hospitalization, ER visits, or surgeries. He denies fever, chills, fatigue, headache, blurred vision, dizziness, syncope, chest pain, palpitations,  orthopnea, paroxysmal nocturnal dyspnea, nausea, vomiting, abdominal pain, back pain, headache, visual disturbances, CVA, PE, DVT, D/C, hematochezia, melena, dysuria, hematuria,  BRBPR or swelling to legs. \par \par \par

## 2023-01-16 NOTE — END OF VISIT
[FreeTextEntry3] : \par I personally scribed for MYCHAL SHOEMAKER on Jan 11 2023  7:15AM . \par \par I personally performed the services described in the documentation, reviewed the documentation recorded by the scribe in my presence and it accurately and completely records my words and actions.\par \par \par \par \par Physician Attestation:\par Documented by MAX LUNDBERG acting as a scribe for MYCHAL SHOEMAKER 01/11/2023 . \par                 All medical record entries made by the Scribe were at my, MYCHAL SHOEMAKER , direction and personally dictated by me on 01/11/2023 . I have reviewed the chart and agree that the record accurately reflects my personal performance of the history, physical exam, assessment and plan\par \par

## 2023-01-16 NOTE — PROCEDURE
[FreeTextEntry1] :  Dr. Bell reviewed the indications for surgery, and used our webpage [email or web address] <[email or web address]> to illustrate the aorta and anatomy of the heart. Those indications are the following: size greater than 5.0 cm, symptomatic aneurysms, family history of aortic dissection or aneurysm death with a size greater than 4.5 cm, other necessary cardiac procedures such as coronary artery bypass grafting or valvular disorders with an aneurysm greater than 4.5 cm, or connective tissue disorders with an aneurysm size greater than 4.5 cm. The patient does not meet size criteria for surgical intervention at the time. Patient was advised to view the educational video prior to this visit regarding aortic pathology, risk factors, surgical procedures, and lifestyle modifications. Video can be retrieved at <[email or web address].[email or web address].\par \par Dr. Bell discussed activity restrictions with the patient, and would advise exercise at a moderate amount with no heavy lifting over one third of body weight, and avoiding heart rates that exceed 140 beats per minute. In addition, every patient should abstain from tobacco abuse and to avoid all illicit drug use, especially stimulants such as cocaine or methamphetamine. Dr. Bell also counseled regarding maintaining a healthy heart diet, and losing any excessive weight as this also put undue stress on both the aorta and entire cardiovascular system. First degree family members should be screened for bicuspid valve disease, and ascending aortic aneurysms. \par \par \par

## 2023-01-16 NOTE — CONSULT LETTER
[FreeTextEntry2] : Dr. Mojgan Faria [FreeTextEntry1] : \par I had the pleasure of seeing your patient, EVGENY GAYTAN , in my office today. We take a multidisciplinary team approach to patient care and consider you, the referring physician, an extension of our team. We will maintain an open line of communication with you throughout your patient's treatment course.  \par \par Ms. GAYTAN presents for an initial evaluation and management of thoracic aortic aneurysm. I have reviewed all of her  medical records and diagnostic images at the time of her office consultation. I have enclosed a copy for your records. \par \par I have also reviewed the indications for surgery, and used our webpage <<[email or web address]>> to illustrate the aorta and anatomy of the heart. The patient does not meet size criteria for surgical intervention at the time. \par \par Therefore, I have recommended that the patient will require a follow up appointment in 1 year with CTA CAP to monitor her   aortic pathology. My office will assist the patient with her upcoming appointment and I will update you on her progress at that time.\par \par I have discussed with the patient that we will continue to monitor her  aortic pathology closely at the [facility] at [facility] that encompasses the entire health care system and is one of the largest in the nation at this point.\par \par It is our commitment to provide your patient with the highest quality of advanced therapeutic options. On behalf of the Cardiothoracic Surgery Team, thank you for sending your patient to the [facility] based at [facility].  If there are any questions or concerns, please call me directly at [phone].  \par \par Sincerely, \par \par \par \par \par Adrian Bell M.D.\par Professor of Cardiovascular and Thoracic Surgery\par Minimally Invasive Valve Surgeon\par Director of Aortic Surgery, [facility]\par Cell: [phone]\par Email: [email or web address] \par \par [facility]:\par [address], [address] Floor, [place] [address]\par Office: [phone]\par Fax: [phone]\par \par [facility]:\par Department of Cardiovascular and Thoracic Surgery\par [address] [place] [address], [address]\par Office: [phone]\par Fax: [phone]\par \par Practice Manager: Ms. Renuka Reed\par Email: [email or web address]\par Phone: [phone]\par \par \par \par \par \par

## 2023-01-16 NOTE — REVIEW OF SYSTEMS
[Negative] : Heme/Lymph [Recent Weight Gain (___ Lbs)] : recent [unfilled] ~Ulb weight gain [Shortness Of Breath] : shortness of breath [SOB on Exertion] : shortness of breath during exertion [FreeTextEntry2] : weight gain over the past year

## 2023-01-16 NOTE — ASSESSMENT
[FreeTextEntry1] : Ms. GAYTAN is a 62 year old female with  past medical history of known heart murmur for 25 yrs, Hep B as a child (resolved as per patient) , Hyperlipidemia, Former smoker quit in 12/2021 with complaints of dyspnea on exertion especially when climbing stairs for one year and recent weight gain of 40 pounds over the past year  . She went for routine physical check up and her labs were abnormal for cholesterol and she went for CT calcium score which revealed aortic dilatation. She can walk up to 1 block/  2 FOS  without shortness of breath. She uses 2 pillows to sleep. She is referred by Dr. Mojgan Faria for initial evaluation and management for Thoracic aortic aneurysm. \par \par 12/13/22 CT heart revealed The calcium score is 0 Agatston units. Stable aneurysm of the ascending aorta, measuring 4.1 cm.\par \par 3/21/22 No contrast CT chest revealed The heart is normal in size. The mid ascending aorta measures 4.1 cm. No pericardial effusion.\par \par 8/24/22 TTE revealed EF 71%, Mild MR, Normal trileaflet aortic valve, PGR 11 mm hg, No AR, Mild TR, Minimal KS. \par \par \par The patient's medical records and diagnostic images were reviewed at the time of this office visit, and the following recommendation was made. Patient does not meet criteria for surgical intervention at the time and will be entered into the aortic registry surveillance program.\par \par \par Plan\par \par 1. Follow up in [facility] in 1 year with gated CTA C/A/P .\par 2. Continue medication regimen.\par 3. Follow up with cardiologist and PCP.\par 4. BP control- I have recommended the patient to monitor his blood pressure closely. I have also advised the patient to take daily blood pressures at home and adhere to medication regimen.\par 5. Discussed signs and symptoms that warrant emergency medical attention \par \par

## 2023-03-12 ENCOUNTER — RESULT CHARGE (OUTPATIENT)
Age: 63
End: 2023-03-12

## 2023-03-13 ENCOUNTER — LABORATORY RESULT (OUTPATIENT)
Age: 63
End: 2023-03-13

## 2023-03-13 ENCOUNTER — NON-APPOINTMENT (OUTPATIENT)
Age: 63
End: 2023-03-13

## 2023-03-13 ENCOUNTER — APPOINTMENT (OUTPATIENT)
Dept: INTERNAL MEDICINE | Facility: CLINIC | Age: 63
End: 2023-03-13
Payer: COMMERCIAL

## 2023-03-13 VITALS
WEIGHT: 197 LBS | DIASTOLIC BLOOD PRESSURE: 84 MMHG | BODY MASS INDEX: 31.66 KG/M2 | SYSTOLIC BLOOD PRESSURE: 120 MMHG | HEIGHT: 66 IN

## 2023-03-13 DIAGNOSIS — R31.29 OTHER MICROSCOPIC HEMATURIA: ICD-10-CM

## 2023-03-13 PROCEDURE — 99396 PREV VISIT EST AGE 40-64: CPT | Mod: 25

## 2023-03-13 PROCEDURE — 93000 ELECTROCARDIOGRAM COMPLETE: CPT | Mod: 59

## 2023-03-13 NOTE — HISTORY OF PRESENT ILLNESS
[de-identified] : This is a 63  year-old patient with a history of fatigue, mild mitral regurg, pulmonary nodule and recently found 4.1 cm thoracic ascending aneurysm who is here today for her annual well examination.

## 2023-03-13 NOTE — PHYSICAL EXAM
[Declined Breast Exam] : declined breast exam  [Declined Rectal Exam] : declined rectal exam [Normal] : no rash [de-identified] : Grade 1/6 systolic ejection murmur

## 2023-03-13 NOTE — ASSESSMENT
[FreeTextEntry1] : Problems\par History of smoking\par Thoracic aortic aneurysm\par Pulmonary nodule\par Mild mitral regurgitation\par Mood disorder\par Assessment\par The patient's vital signs and physical examination were normal.  Noncontrast CT was ordered to assess her pulmonary nodule.  In addition she was advised of the need for an echocardiogram and mammogram .\par She will also need a yearly assessment of her thoracic aortic aneurysm .  In addition I advised the patient to lose weight as she was close to 200 pounds today.  I started her on Wellbutrin  mg daily because of her mood disorder.

## 2023-03-13 NOTE — HEALTH RISK ASSESSMENT
[No] : No [No falls in past year] : Patient reported no falls in the past year [0] : 2) Feeling down, depressed, or hopeless: Not at all (0) [PHQ-2 Negative - No further assessment needed] : PHQ-2 Negative - No further assessment needed [Fully functional (bathing, dressing, toileting, transferring, walking, feeding)] : Fully functional (bathing, dressing, toileting, transferring, walking, feeding) [Fully functional (using the telephone, shopping, preparing meals, housekeeping, doing laundry, using] : Fully functional and needs no help or supervision to perform IADLs (using the telephone, shopping, preparing meals, housekeeping, doing laundry, using transportation, managing medications and managing finances) [[id number]] : 0

## 2023-03-15 LAB
ALBUMIN SERPL ELPH-MCNC: 4.2 G/DL
ALP BLD-CCNC: 120 U/L
ALT SERPL-CCNC: 15 U/L
ANION GAP SERPL CALC-SCNC: 11 MMOL/L
APPEARANCE: CLEAR
AST SERPL-CCNC: 19 U/L
BACTERIA: NEGATIVE
BASOPHILS # BLD AUTO: 0.03 K/UL
BASOPHILS NFR BLD AUTO: 0.4 %
BILIRUB SERPL-MCNC: 0.2 MG/DL
BILIRUBIN URINE: NEGATIVE
BLOOD URINE: NEGATIVE
BUN SERPL-MCNC: 14 MG/DL
CALCIUM SERPL-MCNC: 9.8 MG/DL
CHLORIDE SERPL-SCNC: 107 MMOL/L
CHOLEST SERPL-MCNC: 198 MG/DL
CO2 SERPL-SCNC: 24 MMOL/L
COLOR: YELLOW
CREAT SERPL-MCNC: 0.65 MG/DL
EGFR: 99 ML/MIN/1.73M2
EOSINOPHIL # BLD AUTO: 0.05 K/UL
EOSINOPHIL NFR BLD AUTO: 0.7 %
ESTIMATED AVERAGE GLUCOSE: 114 MG/DL
FERRITIN SERPL-MCNC: 102 NG/ML
FOLATE SERPL-MCNC: >20 NG/ML
GLUCOSE QUALITATIVE U: NEGATIVE
GLUCOSE SERPL-MCNC: 132 MG/DL
HBA1C MFR BLD HPLC: 5.6 %
HCT VFR BLD CALC: 37.7 %
HDLC SERPL-MCNC: 62 MG/DL
HGB BLD-MCNC: 12.1 G/DL
HYALINE CASTS: 2 /LPF
IMM GRANULOCYTES NFR BLD AUTO: 0.3 %
KETONES URINE: NEGATIVE
LDLC SERPL CALC-MCNC: 115 MG/DL
LEUKOCYTE ESTERASE URINE: NEGATIVE
LYMPHOCYTES # BLD AUTO: 2.29 K/UL
LYMPHOCYTES NFR BLD AUTO: 31.2 %
MAN DIFF?: NORMAL
MCHC RBC-ENTMCNC: 30.3 PG
MCHC RBC-ENTMCNC: 32.1 GM/DL
MCV RBC AUTO: 94.3 FL
MICROSCOPIC-UA: NORMAL
MONOCYTES # BLD AUTO: 0.64 K/UL
MONOCYTES NFR BLD AUTO: 8.7 %
NEUTROPHILS # BLD AUTO: 4.32 K/UL
NEUTROPHILS NFR BLD AUTO: 58.7 %
NITRITE URINE: NEGATIVE
NONHDLC SERPL-MCNC: 136 MG/DL
PH URINE: 6
PLATELET # BLD AUTO: 347 K/UL
POTASSIUM SERPL-SCNC: 4.3 MMOL/L
PROT SERPL-MCNC: 6.9 G/DL
PROTEIN URINE: NORMAL
RBC # BLD: 4 M/UL
RBC # FLD: 14.2 %
RED BLOOD CELLS URINE: 2 /HPF
SODIUM SERPL-SCNC: 142 MMOL/L
SPECIFIC GRAVITY URINE: 1.03
SQUAMOUS EPITHELIAL CELLS: 6 /HPF
T3RU NFR SERPL: 1.1 TBI
T4 SERPL-MCNC: 7.7 UG/DL
TRIGL SERPL-MCNC: 105 MG/DL
TSH SERPL-ACNC: 1.6 UIU/ML
URATE SERPL-MCNC: 3.4 MG/DL
UROBILINOGEN URINE: ABNORMAL
VIT B12 SERPL-MCNC: 701 PG/ML
WBC # FLD AUTO: 7.35 K/UL
WHITE BLOOD CELLS URINE: 3 /HPF

## 2023-04-04 ENCOUNTER — NON-APPOINTMENT (OUTPATIENT)
Age: 63
End: 2023-04-04

## 2023-04-04 VITALS — BODY MASS INDEX: 31.66 KG/M2 | HEIGHT: 66 IN | WEIGHT: 197 LBS

## 2023-04-04 NOTE — REASON FOR VISIT
[Home] : at home, [unfilled] , at the time of the visit. [Medical Office: ([facility])___] : at the medical office located in  [Verbal consent obtained from patient] : the patient, [unfilled] [Initial Evaluation] : an initial evaluation visit [Review of Eligibility] : review of eligibility [Low-Dose CT Screening Discussion] : low-dose CT lung cancer screening discussion [Virtual Visit] : virtual visit

## 2023-04-04 NOTE — HISTORY OF PRESENT ILLNESS
[Former] : Former [>=20 Pack-Years] : Twenty pack years or greater smoking history: Yes [TextBox_13] : Referred by Dr. Faria\par \par Ms. EVGENY GAYTAN is a 63 year old woman with a history of nicotine dependence\par \par  Over the telephone today we reviewed and confirmed that the patient meets screening eligibility criteria:\par \par -Age: 63 years old\par \par Smoking status:\par \par -Former smoker. Quit 2020, 20 pack years, smoked between ages [age]\par \par \par \par \par Ms. GAYTAN denies any personal history of lung cancer. Denies any s/s of lung cancer. No lung cancer in a 1st degree relative. Denies any history of lung disease. Denies any history of occupational exposures.H/o aortic aneurysm.\par  [YearQuit] : 2020 [PacksperYear] : 20

## 2023-04-05 ENCOUNTER — APPOINTMENT (OUTPATIENT)
Dept: CT IMAGING | Facility: IMAGING CENTER | Age: 63
End: 2023-04-05
Payer: COMMERCIAL

## 2023-04-05 ENCOUNTER — OUTPATIENT (OUTPATIENT)
Dept: OUTPATIENT SERVICES | Facility: HOSPITAL | Age: 63
LOS: 1 days | End: 2023-04-05
Payer: COMMERCIAL

## 2023-04-05 DIAGNOSIS — R91.1 SOLITARY PULMONARY NODULE: ICD-10-CM

## 2023-04-05 DIAGNOSIS — Z98.890 OTHER SPECIFIED POSTPROCEDURAL STATES: Chronic | ICD-10-CM

## 2023-04-05 PROCEDURE — 71271 CT THORAX LUNG CANCER SCR C-: CPT

## 2023-04-05 PROCEDURE — 71271 CT THORAX LUNG CANCER SCR C-: CPT | Mod: 26

## 2023-04-14 ENCOUNTER — APPOINTMENT (OUTPATIENT)
Dept: INTERNAL MEDICINE | Facility: CLINIC | Age: 63
End: 2023-04-14

## 2023-05-05 ENCOUNTER — RX RENEWAL (OUTPATIENT)
Age: 63
End: 2023-05-05

## 2023-05-12 ENCOUNTER — NON-APPOINTMENT (OUTPATIENT)
Age: 63
End: 2023-05-12

## 2023-05-24 ENCOUNTER — APPOINTMENT (OUTPATIENT)
Dept: ULTRASOUND IMAGING | Facility: CLINIC | Age: 63
End: 2023-05-24
Payer: COMMERCIAL

## 2023-05-24 PROCEDURE — 93971 EXTREMITY STUDY: CPT | Mod: RT

## 2023-06-22 ENCOUNTER — APPOINTMENT (OUTPATIENT)
Dept: MRI IMAGING | Facility: CLINIC | Age: 63
End: 2023-06-22
Payer: COMMERCIAL

## 2023-06-22 PROCEDURE — 73721 MRI JNT OF LWR EXTRE W/O DYE: CPT | Mod: LT

## 2023-07-11 ENCOUNTER — LABORATORY RESULT (OUTPATIENT)
Age: 63
End: 2023-07-11

## 2023-07-11 ENCOUNTER — APPOINTMENT (OUTPATIENT)
Dept: INTERNAL MEDICINE | Facility: CLINIC | Age: 63
End: 2023-07-11
Payer: COMMERCIAL

## 2023-07-11 VITALS — DIASTOLIC BLOOD PRESSURE: 77 MMHG | SYSTOLIC BLOOD PRESSURE: 120 MMHG

## 2023-07-11 VITALS — HEIGHT: 66 IN | BODY MASS INDEX: 30.37 KG/M2 | WEIGHT: 189 LBS

## 2023-07-11 PROCEDURE — 99214 OFFICE O/P EST MOD 30 MIN: CPT | Mod: 25

## 2023-07-11 PROCEDURE — 36415 COLL VENOUS BLD VENIPUNCTURE: CPT

## 2023-07-11 RX ORDER — BUPROPION HYDROCHLORIDE 100 MG/1
100 TABLET, FILM COATED, EXTENDED RELEASE ORAL DAILY
Qty: 90 | Refills: 1 | Status: DISCONTINUED | COMMUNITY
Start: 2023-03-13 | End: 2023-07-11

## 2023-07-11 NOTE — HISTORY OF PRESENT ILLNESS
[de-identified] : This is a 63-year-old patient with a history of thoracic aortic aneurysm, fatigue, pulmonary nodule, mood disorder who is here today for follow-up visit

## 2023-07-11 NOTE — ASSESSMENT
[FreeTextEntry1] : Problems\par Thoracic aortic aneurysm\par Pulmonary nodule mood \par Mood disorder\par Hypercholesterolemia\par Assessment\par Of the patient presently is being followed by cardiothoracic surgery on a regular basis to assess her thoracic aortic aneurysm.  In addition because of her previous smoking pulmonary nodule has been followed which on recent CAT scan in April did not reveal any changes.  Bloods were drawn today to assess her HDLs and LDLs and in addition she was started on Wellbutrin sustained-release 100 mg for her weight and for mood disorder and she has lost 8 pounds since her last visit .  Her dose was lowered to 75 mg Wellbutrin because she felt the higher dose was making her a little agitated\par

## 2023-07-12 LAB
ALBUMIN SERPL ELPH-MCNC: 4.5 G/DL
ALP BLD-CCNC: 118 U/L
ALT SERPL-CCNC: 11 U/L
ANION GAP SERPL CALC-SCNC: 13 MMOL/L
APPEARANCE: CLEAR
AST SERPL-CCNC: 16 U/L
BACTERIA: ABNORMAL /HPF
BILIRUB SERPL-MCNC: <0.2 MG/DL
BILIRUBIN URINE: NEGATIVE
BLOOD URINE: NEGATIVE
BUN SERPL-MCNC: 11 MG/DL
CALCIUM SERPL-MCNC: 10.2 MG/DL
CAST: 0 /LPF
CHLORIDE SERPL-SCNC: 105 MMOL/L
CHOLEST SERPL-MCNC: 208 MG/DL
CO2 SERPL-SCNC: 24 MMOL/L
COLOR: YELLOW
CREAT SERPL-MCNC: 0.72 MG/DL
EGFR: 94 ML/MIN/1.73M2
EPITHELIAL CELLS: 8 /HPF
ESTIMATED AVERAGE GLUCOSE: 117 MG/DL
GLUCOSE QUALITATIVE U: NEGATIVE MG/DL
GLUCOSE SERPL-MCNC: 74 MG/DL
HBA1C MFR BLD HPLC: 5.7 %
HDLC SERPL-MCNC: 68 MG/DL
KETONES URINE: ABNORMAL MG/DL
LDLC SERPL CALC-MCNC: 117 MG/DL
LEUKOCYTE ESTERASE URINE: ABNORMAL
MICROSCOPIC-UA: NORMAL
NITRITE URINE: NEGATIVE
NONHDLC SERPL-MCNC: 141 MG/DL
PH URINE: 5.5
POTASSIUM SERPL-SCNC: 5 MMOL/L
PROT SERPL-MCNC: 6.9 G/DL
PROTEIN URINE: NEGATIVE MG/DL
RED BLOOD CELLS URINE: 0 /HPF
SODIUM SERPL-SCNC: 142 MMOL/L
SPECIFIC GRAVITY URINE: 1.02
T3RU NFR SERPL: 1 TBI
T4 SERPL-MCNC: 8.7 UG/DL
TRIGL SERPL-MCNC: 132 MG/DL
TSH SERPL-ACNC: 1.81 UIU/ML
URATE SERPL-MCNC: 3 MG/DL
UROBILINOGEN URINE: 0.2 MG/DL
WHITE BLOOD CELLS URINE: 10 /HPF

## 2023-08-28 ENCOUNTER — NON-APPOINTMENT (OUTPATIENT)
Age: 63
End: 2023-08-28

## 2023-12-18 ENCOUNTER — NON-APPOINTMENT (OUTPATIENT)
Age: 63
End: 2023-12-18

## 2023-12-19 ENCOUNTER — APPOINTMENT (OUTPATIENT)
Dept: INTERNAL MEDICINE | Facility: CLINIC | Age: 63
End: 2023-12-19
Payer: COMMERCIAL

## 2023-12-19 PROCEDURE — 99442: CPT

## 2023-12-19 RX ORDER — BUPROPION HYDROCHLORIDE 75 MG/1
75 TABLET, FILM COATED ORAL
Qty: 90 | Refills: 2 | Status: DISCONTINUED | COMMUNITY
Start: 2023-07-11 | End: 2023-12-19

## 2023-12-19 RX ORDER — ALPRAZOLAM 0.25 MG/1
0.25 TABLET ORAL TWICE DAILY
Qty: 60 | Refills: 0 | Status: ACTIVE | COMMUNITY
Start: 2023-03-16 | End: 1900-01-01

## 2023-12-19 NOTE — HISTORY OF PRESENT ILLNESS
[Home] : at home, [unfilled] , at the time of the visit. [Medical Office: ([facility])___] : at the medical office located in  [Verbal consent obtained from patient] : the patient, [unfilled] [de-identified] : This is a 63-year-old patient with a history of 4.2 cm thoracic aortic aneurysm who also has a history of heavy smoking and requires a screening CT of the lungs.  Who wants to discuss how to proceed for her CAT scans.

## 2023-12-19 NOTE — ASSESSMENT
[FreeTextEntry1] : I advised her to see the cardiothoracic surgeon first as he may want to proceed with a CT angiogram of the chest and also include the abdomen and pelvis to rule out aneurysms in other locations.  I also informed her that many times with the CT angiogram of the chest we can get good enough pictures so that would obviate doing a dedicated CT screening of the chest so her first stop will be cardiothoracic surgeon

## 2023-12-30 ENCOUNTER — APPOINTMENT (OUTPATIENT)
Dept: CT IMAGING | Facility: CLINIC | Age: 63
End: 2023-12-30
Payer: COMMERCIAL

## 2023-12-30 PROCEDURE — 74174 CTA ABD&PLVS W/CONTRAST: CPT

## 2023-12-30 PROCEDURE — 71275 CT ANGIOGRAPHY CHEST: CPT

## 2024-01-05 PROBLEM — R07.89 ATYPICAL CHEST PAIN: Status: ACTIVE | Noted: 2019-09-10

## 2024-01-05 PROBLEM — R01.1 HEART MURMUR: Status: ACTIVE | Noted: 2017-10-31

## 2024-01-10 ENCOUNTER — APPOINTMENT (OUTPATIENT)
Dept: CARDIOTHORACIC SURGERY | Facility: CLINIC | Age: 64
End: 2024-01-10
Payer: COMMERCIAL

## 2024-01-10 VITALS
DIASTOLIC BLOOD PRESSURE: 87 MMHG | SYSTOLIC BLOOD PRESSURE: 131 MMHG | WEIGHT: 197 LBS | RESPIRATION RATE: 14 BRPM | HEIGHT: 66 IN | OXYGEN SATURATION: 97 % | BODY MASS INDEX: 31.66 KG/M2 | HEART RATE: 71 BPM | TEMPERATURE: 98.1 F

## 2024-01-10 DIAGNOSIS — R07.89 OTHER CHEST PAIN: ICD-10-CM

## 2024-01-10 DIAGNOSIS — R01.1 CARDIAC MURMUR, UNSPECIFIED: ICD-10-CM

## 2024-01-10 PROCEDURE — 99214 OFFICE O/P EST MOD 30 MIN: CPT

## 2024-01-11 NOTE — END OF VISIT
[FreeTextEntry3] :   I, MYCHAL Ramirez , personally performed the evaluation and management (E/M) services for this established  patient. That E/M includes conducting the initial examination, assessing all conditions, and establishing the plan of care. Today, MAX KUNZ  was here to observe my evaluation and management services for this patient.

## 2024-01-11 NOTE — PROCEDURE
[FreeTextEntry1] :  Dr. Bell reviewed the indications for surgery, and used our webpage [email or web address] <[email or web address]> to illustrate the aorta and anatomy of the heart. Those indications are the following: size greater than 5.0 cm, symptomatic aneurysms, family history of aortic dissection or aneurysm death with a size greater than 4.5 cm, other necessary cardiac procedures such as coronary artery bypass grafting or valvular disorders with an aneurysm greater than 4.5 cm, or connective tissue disorders with an aneurysm size greater than 4.5 cm. The patient does not meet size criteria for surgical intervention at the time. Patient was advised to view the educational video prior to this visit regarding aortic pathology, risk factors, surgical procedures, and lifestyle modifications. Video can be retrieved at <[email or web address].\par  \par  Dr. Bell discussed activity restrictions with the patient, and would advise exercise at a moderate amount with no heavy lifting over one third of body weight, and avoiding heart rates that exceed 140 beats per minute. In addition, every patient should abstain from tobacco abuse and to avoid all illicit drug use, especially stimulants such as cocaine or methamphetamine. Dr. Bell also counseled regarding maintaining a healthy heart diet, and losing any excessive weight as this also put undue stress on both the aorta and entire cardiovascular system. First degree family members should be screened for bicuspid valve disease, and ascending aortic aneurysms. \par  \par  \par

## 2024-01-11 NOTE — DATA REVIEWED
[FreeTextEntry1] :  12/30/23 CTA CAP revealed unchanged 4.1 cm ascending aorta.  4/5/23 CT Chest LDCT revealed  mid ascending aorta measures 4.1 cm, stable. Few subcentimeter pulmonary nodules are unchanged since CT chest March 29, 2019. Lung RADS 2 - Benign Appearance or Behavior.  Nodules with a very low likelihood of becoming a clinically active cancer due to size or lack of growth.  12/13/22 CT heart revealed The calcium score is 0 Agatston units. Stable aneurysm of the ascending aorta, measuring 4.1 cm.  3/21/22 No contrast CT chest revealed The heart is normal in size. The mid ascending aorta measures 4.1 cm. No pericardial effusion.  8/24/22 TTE revealed EF 71%, Mild MR, Normal trileaflet aortic valve, PGR 11 mm hg, No AR, Mild TR, Minimal NM.

## 2024-01-11 NOTE — ASSESSMENT
[FreeTextEntry1] :  Ms. GAYTAN is a 63 year old female with  past medical history of known heart murmur for 25 yrs, Hep B as a child (resolved as per patient) , Hyperlipidemia, Former smoker quit in 12/2021  . She went for routine physical check up and her labs were abnormal for cholesterol and she went for CT calcium score which revealed aortic dilatation. She Was referred by Dr. Mojgan Faria. She is here for 3 months follow up visit with recent imaging.     12/30/23 CTA CAP revealed unchanged 4.1 cm ascending aorta.  4/5/23 CT Chest LDCT revealed  mid ascending aorta measures 4.1 cm, stable. Few subcentimeter pulmonary nodules are unchanged since CT chest March 29, 2019. Lung RADS 2 - Benign Appearance or Behavior.  Nodules with a very low likelihood of becoming a clinically active cancer due to size or lack of growth.  The patient's medical records and diagnostic images were reviewed at the time of this office visit, and the following recommendation was made. Patient does not meet criteria for surgical intervention at the time and will be entered into the aortic registry surveillance program.   Plan  1. [facility] up in [facility] 2 years with CTA C/A/P . 2. Continue medication regimen. 3. Follow up with cardiologist and PCP. 4. BP control- I have recommended the patient to monitor his blood pressure closely. I have also advised the patient to take daily blood pressures at home and adhere to medication regimen. 5. Discussed signs and symptoms that warrant emergency medical attention

## 2024-01-11 NOTE — HISTORY OF PRESENT ILLNESS
[FreeTextEntry1] : Ms. GAYTAN is a 63 year old female with  past medical history of known heart murmur for 25 yrs, Hep B as a child (resolved as per patient) , Hyperlipidemia, Former smoker quit in 12/2021  . She went for routine physical check up and her labs were abnormal for cholesterol and she went for CT calcium score which revealed aortic dilatation. She Was referred by Dr. Mojgan Faria. She is here for 3 months follow up visit with recent imaging.   Today she presents and reports that she has occasional shortness of breath with stairs which remains the same.   Patient is doing well and denies recent hospitalization, ER visits, or surgeries. He denies fever, chills, fatigue, headache, blurred vision, dizziness, syncope, chest pain, palpitations, orthopnea, paroxysmal nocturnal dyspnea, nausea, vomiting, abdominal pain, back pain, headache, visual disturbances, CVA, PE, DVT, D/C, hematochezia, melena, dysuria, hematuria,  BRBPR or swelling to legs.     12/30/23 CTA CAP revealed unchanged 4.1 cm ascending aorta.  4/5/23 CT Chest LDCT revealed  mid ascending aorta measures 4.1 cm, stable. Few subcentimeter pulmonary nodules are unchanged since CT chest March 29, 2019. Lung RADS 2 - Benign Appearance or Behavior.  Nodules with a very low likelihood of becoming a clinically active cancer due to size or lack of growth.

## 2024-01-11 NOTE — CONSULT LETTER
[Dear  ___] : Dear  [unfilled], [FreeTextEntry2] : Dr. Mojgan Faria [FreeTextEntry1] : I had the pleasure of seeing your patient, EVGENY GAYTAN , in my office today. We take a multidisciplinary team approach to patient care and consider you, the referring physician, an extension of our team. We will maintain an open line of communication with you throughout your patient's treatment course.    Ms. GAYTAN presents for an initial evaluation and management of thoracic aortic aneurysm. I have reviewed all of her  medical records and diagnostic images at the time of her office consultation. I have enclosed a copy for your records.   I have also reviewed the indications for surgery, and used our webpage <<[email or web address]>> to illustrate the aorta and anatomy of the heart. The patient does not meet size criteria for surgical intervention at the time.   Therefore, I have recommended that the patient will require a follow up appointment in 2 years with CTA CAP to monitor her   aortic pathology. My office will assist the patient with her upcoming appointment and I will update you on her progress at that time.  I have discussed with the patient that we will continue to monitor her  aortic pathology closely at the [facility] at [facility] that encompasses the entire health care system and is one of the largest in the nation at this point.  It is our commitment to provide your patient with the highest quality of advanced therapeutic options. On behalf of the Cardiothoracic Surgery Team, thank you for sending your patient to the [facility] based at [facility].  If there are any questions or concerns, please call me directly at [phone].    Sincerely,      Adrian Bell M.D. Professor of Cardiovascular and Thoracic Surgery Minimally Invasive Valve Surgeon Director of Aortic Surgery, [facility] Cell: [phone] Email: [email or web address] [place] [address], [address]th Floor, [place] [address] Office: [phone] Fax: [phone]  [facility]: Department of Cardiovascular and Thoracic Surgery [address] [place] [address], [address] Office: [phone] Fax: [phone]  Practice Manager: Ms. Renuka Reed Email: [email or web address] Phone: [phone]

## 2024-01-11 NOTE — PHYSICAL EXAM
[Sclera] : the sclera and conjunctiva were normal [PERRL With Normal Accommodation] : pupils were equal in size, round, and reactive to light [Neck Appearance] : the appearance of the neck was normal [] : no respiratory distress [Respiration, Rhythm And Depth] : normal respiratory rhythm and effort [Auscultation Breath Sounds / Voice Sounds] : lungs were clear to auscultation bilaterally [Apical Impulse] : the apical impulse was normal [Heart Rate And Rhythm] : heart rate was normal and rhythm regular [Heart Sounds] : normal S1 and S2 [Murmurs] : no murmurs [Examination Of The Chest] : the chest was normal in appearance [2+] : left 2+ [Breast Appearance] : normal in appearance [Bowel Sounds] : normal bowel sounds [Abdomen Soft] : soft [FreeTextEntry1] : Deferred  [No CVA Tenderness] : no ~M costovertebral angle tenderness [Abnormal Walk] : normal gait [Involuntary Movements] : no involuntary movements were seen [Skin Color & Pigmentation] : normal skin color and pigmentation [Skin Turgor] : normal skin turgor [No Focal Deficits] : no focal deficits [Impaired Insight] : insight and judgment were intact [Oriented To Time, Place, And Person] : oriented to person, place, and time [Affect] : the affect was normal [Mood] : the mood was normal [Memory Recent] : recent memory was not impaired [Memory Remote] : remote memory was not impaired

## 2024-02-15 ENCOUNTER — NON-APPOINTMENT (OUTPATIENT)
Age: 64
End: 2024-02-15

## 2024-02-16 ENCOUNTER — OUTPATIENT (OUTPATIENT)
Dept: OUTPATIENT SERVICES | Facility: HOSPITAL | Age: 64
LOS: 1 days | End: 2024-02-16
Payer: COMMERCIAL

## 2024-02-16 VITALS
WEIGHT: 199.08 LBS | DIASTOLIC BLOOD PRESSURE: 86 MMHG | HEART RATE: 74 BPM | HEIGHT: 66 IN | TEMPERATURE: 98 F | RESPIRATION RATE: 12 BRPM | OXYGEN SATURATION: 98 % | SYSTOLIC BLOOD PRESSURE: 127 MMHG

## 2024-02-16 DIAGNOSIS — Z98.890 OTHER SPECIFIED POSTPROCEDURAL STATES: Chronic | ICD-10-CM

## 2024-02-16 DIAGNOSIS — R15.9 FULL INCONTINENCE OF FECES: ICD-10-CM

## 2024-02-16 DIAGNOSIS — Z01.818 ENCOUNTER FOR OTHER PREPROCEDURAL EXAMINATION: ICD-10-CM

## 2024-02-16 LAB
ANION GAP SERPL CALC-SCNC: 11 MMOL/L — SIGNIFICANT CHANGE UP (ref 5–17)
BUN SERPL-MCNC: 14 MG/DL — SIGNIFICANT CHANGE UP (ref 7–23)
CALCIUM SERPL-MCNC: 10.4 MG/DL — SIGNIFICANT CHANGE UP (ref 8.4–10.5)
CHLORIDE SERPL-SCNC: 106 MMOL/L — SIGNIFICANT CHANGE UP (ref 96–108)
CO2 SERPL-SCNC: 26 MMOL/L — SIGNIFICANT CHANGE UP (ref 22–31)
CREAT SERPL-MCNC: 0.63 MG/DL — SIGNIFICANT CHANGE UP (ref 0.5–1.3)
EGFR: 100 ML/MIN/1.73M2 — SIGNIFICANT CHANGE UP
GLUCOSE SERPL-MCNC: 88 MG/DL — SIGNIFICANT CHANGE UP (ref 70–99)
HCT VFR BLD CALC: 38.6 % — SIGNIFICANT CHANGE UP (ref 34.5–45)
HGB BLD-MCNC: 12.3 G/DL — SIGNIFICANT CHANGE UP (ref 11.5–15.5)
MCHC RBC-ENTMCNC: 29.9 PG — SIGNIFICANT CHANGE UP (ref 27–34)
MCHC RBC-ENTMCNC: 31.9 GM/DL — LOW (ref 32–36)
MCV RBC AUTO: 93.9 FL — SIGNIFICANT CHANGE UP (ref 80–100)
NRBC # BLD: 0 /100 WBCS — SIGNIFICANT CHANGE UP (ref 0–0)
PLATELET # BLD AUTO: 340 K/UL — SIGNIFICANT CHANGE UP (ref 150–400)
POTASSIUM SERPL-MCNC: 4.6 MMOL/L — SIGNIFICANT CHANGE UP (ref 3.5–5.3)
POTASSIUM SERPL-SCNC: 4.6 MMOL/L — SIGNIFICANT CHANGE UP (ref 3.5–5.3)
RBC # BLD: 4.11 M/UL — SIGNIFICANT CHANGE UP (ref 3.8–5.2)
RBC # FLD: 14 % — SIGNIFICANT CHANGE UP (ref 10.3–14.5)
SODIUM SERPL-SCNC: 143 MMOL/L — SIGNIFICANT CHANGE UP (ref 135–145)
WBC # BLD: 7.51 K/UL — SIGNIFICANT CHANGE UP (ref 3.8–10.5)
WBC # FLD AUTO: 7.51 K/UL — SIGNIFICANT CHANGE UP (ref 3.8–10.5)

## 2024-02-16 PROCEDURE — G0463: CPT

## 2024-02-16 PROCEDURE — 85027 COMPLETE CBC AUTOMATED: CPT

## 2024-02-16 PROCEDURE — 36415 COLL VENOUS BLD VENIPUNCTURE: CPT

## 2024-02-16 PROCEDURE — 80048 BASIC METABOLIC PNL TOTAL CA: CPT

## 2024-02-16 RX ORDER — CEFOTETAN DISODIUM 1 G
2 VIAL (EA) INJECTION ONCE
Refills: 0 | Status: DISCONTINUED | OUTPATIENT
Start: 2024-02-23 | End: 2024-03-08

## 2024-02-16 RX ORDER — CHLORHEXIDINE GLUCONATE 213 G/1000ML
1 SOLUTION TOPICAL ONCE
Refills: 0 | Status: DISCONTINUED | OUTPATIENT
Start: 2024-02-23 | End: 2024-03-08

## 2024-02-16 RX ORDER — SODIUM CHLORIDE 9 MG/ML
3 INJECTION INTRAMUSCULAR; INTRAVENOUS; SUBCUTANEOUS EVERY 8 HOURS
Refills: 0 | Status: DISCONTINUED | OUTPATIENT
Start: 2024-02-23 | End: 2024-03-08

## 2024-02-16 RX ORDER — SERTRALINE 25 MG/1
1 TABLET, FILM COATED ORAL
Qty: 0 | Refills: 0 | DISCHARGE

## 2024-02-16 RX ORDER — SODIUM CHLORIDE 9 MG/ML
1000 INJECTION, SOLUTION INTRAVENOUS
Refills: 0 | Status: DISCONTINUED | OUTPATIENT
Start: 2024-02-23 | End: 2024-03-08

## 2024-02-16 RX ORDER — LIDOCAINE HCL 20 MG/ML
0.2 VIAL (ML) INJECTION ONCE
Refills: 0 | Status: DISCONTINUED | OUTPATIENT
Start: 2024-02-23 | End: 2024-03-08

## 2024-02-16 NOTE — H&P PST ADULT - ASSESSMENT
Activity: Patient states she can walk 1-2 blocks and up a flight of stairs with no symptoms.     DASI: 5.72    Mallampati: 3     Dental: Denies loose teeth or dentures.

## 2024-02-16 NOTE — H&P PST ADULT - NSICDXPASTMEDICALHX_GEN_ALL_CORE_FT
PAST MEDICAL HISTORY:  Ascending aortic aneurysm     Bronchitis     Desensitization to allergens     Displaced fracture of hook process of hamate [unciform] bone, left wrist, initial encounter for closed fracture     Heart murmur     Psoriasis on scalp  last episode 2 years ago

## 2024-02-16 NOTE — H&P PST ADULT - HISTORY OF PRESENT ILLNESS
63 year old female with PMH ascending aortic aneurysm (4.1 cm- follows with CT surgeon for routine follow ups, has been stable at 4.1 cm as of 12/30/23), Hepatitis B as a child, heart murmur (echo from 8/2022 in chart). Patient states she has been having fecal incontinence, and so far treatment she has undergone have not been successful. She presents today to PST prior to scheduled Stage 1 Sacral Nerve Stimulation on 2/23/24. Patient denies recent fever, chills, chest pain, SOB, palpitations, or recent exposure to COVID-19.

## 2024-02-16 NOTE — H&P PST ADULT - PROBLEM SELECTOR PLAN 1
Stage 1 Sacral Nerve Stimulation on 2/23/24.  CBC and BMP done today at [facility].   Pre op instructions, including chlorhexidine, provided and all questions answered.

## 2024-02-20 ENCOUNTER — NON-APPOINTMENT (OUTPATIENT)
Age: 64
End: 2024-02-20

## 2024-02-20 ENCOUNTER — APPOINTMENT (OUTPATIENT)
Dept: INTERNAL MEDICINE | Facility: CLINIC | Age: 64
End: 2024-02-20
Payer: COMMERCIAL

## 2024-02-20 VITALS
BODY MASS INDEX: 31.5 KG/M2 | HEIGHT: 66 IN | HEART RATE: 66 BPM | SYSTOLIC BLOOD PRESSURE: 145 MMHG | DIASTOLIC BLOOD PRESSURE: 72 MMHG | WEIGHT: 196 LBS

## 2024-02-20 DIAGNOSIS — Z01.818 ENCOUNTER FOR OTHER PREPROCEDURAL EXAMINATION: ICD-10-CM

## 2024-02-20 PROCEDURE — 99214 OFFICE O/P EST MOD 30 MIN: CPT

## 2024-02-20 RX ORDER — TRAMADOL HYDROCHLORIDE 50 MG/1
50 TABLET, COATED ORAL
Qty: 14 | Refills: 0 | Status: ACTIVE | COMMUNITY
Start: 2024-02-20 | End: 1900-01-01

## 2024-02-20 RX ORDER — NAPROXEN 500 MG/1
500 TABLET ORAL
Qty: 14 | Refills: 0 | Status: ACTIVE | COMMUNITY
Start: 2024-02-20 | End: 1900-01-01

## 2024-02-21 NOTE — ASSESSMENT
[Patient Optimized for Surgery] : Patient optimized for surgery [FreeTextEntry4] : Patient was seen and examined for medical clearance. A full H and P was performed. Vitals taken.  will treat back pain but told not to take much else prior to surgery/night before

## 2024-02-21 NOTE — HISTORY OF PRESENT ILLNESS
[No Pertinent Cardiac History] : no history of aortic stenosis, atrial fibrillation, coronary artery disease, recent myocardial infarction, or implantable device/pacemaker [No Pertinent Pulmonary History] : no history of asthma, COPD, sleep apnea, or smoking [No Adverse Anesthesia Reaction] : no adverse anesthesia reaction in self or family member [(Patient denies any chest pain, claudication, dyspnea on exertion, orthopnea, palpitations or syncope)] : Patient denies any chest pain, claudication, dyspnea on exertion, orthopnea, palpitations or syncope [Chronic Anticoagulation] : no chronic anticoagulation [Chronic Kidney Disease] : no chronic kidney disease [Diabetes] : no diabetes [FreeTextEntry1] : [employer] [FreeTextEntry2] : 2/23/24 [FreeTextEntry3] :  Dr Maggie Cardoso [FreeTextEntry4] : 63 year old female going for an annex procedure.    pulled a muscle, didn't help, back hurts

## 2024-02-22 ENCOUNTER — TRANSCRIPTION ENCOUNTER (OUTPATIENT)
Age: 64
End: 2024-02-22

## 2024-02-22 PROBLEM — I71.21 ANEURYSM OF THE ASCENDING AORTA, WITHOUT RUPTURE: Chronic | Status: ACTIVE | Noted: 2024-02-16

## 2024-02-23 ENCOUNTER — TRANSCRIPTION ENCOUNTER (OUTPATIENT)
Age: 64
End: 2024-02-23

## 2024-02-23 ENCOUNTER — OUTPATIENT (OUTPATIENT)
Dept: OUTPATIENT SERVICES | Facility: HOSPITAL | Age: 64
LOS: 1 days | End: 2024-02-23
Payer: COMMERCIAL

## 2024-02-23 VITALS
SYSTOLIC BLOOD PRESSURE: 124 MMHG | OXYGEN SATURATION: 98 % | RESPIRATION RATE: 15 BRPM | HEART RATE: 67 BPM | TEMPERATURE: 97 F | DIASTOLIC BLOOD PRESSURE: 69 MMHG

## 2024-02-23 VITALS
OXYGEN SATURATION: 100 % | HEIGHT: 66 IN | WEIGHT: 199.08 LBS | TEMPERATURE: 98 F | DIASTOLIC BLOOD PRESSURE: 75 MMHG | SYSTOLIC BLOOD PRESSURE: 121 MMHG | HEART RATE: 65 BPM | RESPIRATION RATE: 16 BRPM

## 2024-02-23 DIAGNOSIS — Z98.890 OTHER SPECIFIED POSTPROCEDURAL STATES: Chronic | ICD-10-CM

## 2024-02-23 DIAGNOSIS — R15.9 FULL INCONTINENCE OF FECES: ICD-10-CM

## 2024-02-23 DIAGNOSIS — Z01.818 ENCOUNTER FOR OTHER PREPROCEDURAL EXAMINATION: ICD-10-CM

## 2024-02-23 PROCEDURE — C1778: CPT

## 2024-02-23 PROCEDURE — C1894: CPT

## 2024-02-23 PROCEDURE — 64581 OPN IMPLTJ NEA SACRAL NERVE: CPT

## 2024-02-23 PROCEDURE — 76000 FLUOROSCOPY <1 HR PHYS/QHP: CPT

## 2024-02-23 PROCEDURE — C1883: CPT

## 2024-02-23 DEVICE — KIT IMP STIMULATOR LEAD STRL: Type: IMPLANTABLE DEVICE | Status: FUNCTIONAL

## 2024-02-23 DEVICE — KIT TINED LEAD INSUL: Type: IMPLANTABLE DEVICE | Status: FUNCTIONAL

## 2024-02-23 DEVICE — EXTENSION NEUROSTIM PERCUTANEOUS STRL: Type: IMPLANTABLE DEVICE | Status: FUNCTIONAL

## 2024-02-23 RX ORDER — CEPHALEXIN 500 MG
1 CAPSULE ORAL
Qty: 6 | Refills: 0
Start: 2024-02-23 | End: 2024-02-25

## 2024-02-23 NOTE — BRIEF OPERATIVE NOTE - NSICDXBRIEFPROCEDURE_GEN_ALL_CORE_FT
PROCEDURES:  Insertion, sacral nerve stimulator, percutaneous 23-Feb-2024 12:17:46  Augustin Heller

## 2024-02-23 NOTE — ASU DISCHARGE PLAN (ADULT/PEDIATRIC) - MEDICATION INSTRUCTIONS
You may take acetaminophen (Tylenol) and/or ibuprofen (Motrin) as directed on the bottle, as needed for pain relief.

## 2024-02-23 NOTE — ASU DISCHARGE PLAN (ADULT/PEDIATRIC) - CARE PROVIDER_API CALL
Maggie Cardoso  Surgery  [address] [place] [address]  Phone: [phone]  Fax: [phone]  Follow Up Time: 1 week

## 2024-02-23 NOTE — BRIEF OPERATIVE NOTE - OPERATION/FINDINGS
Insertion of sacral nerve stimulator using intra-operative fluoroscopic guidance. Needle localization under fluoroscopy performed to attain access to the S4 foramen (S3 attempted however despite good lead placement, no sphincter response was observed) followed by wire-guided catheter placement. A subcutaneous tissue pocket was created to store the electrode wire and irrigated with no evidence of bleeding or infection, and the wire was tunneled under the skin through to this pocket. Wire then tunneled again to the contralateral back and attached to battery with confirmation of appropriate device response. Wound dressed with gauze, Telfa and tegaderm. Skin over subcutaneous pocket closed with 3-0 Vicryl deep dermal and 4-0 Monocryl subcutaneous sutures.

## 2024-02-23 NOTE — ASU DISCHARGE PLAN (ADULT/PEDIATRIC) - NURSING INSTRUCTIONS
The next time you can take Tylenol is at ________________________  and you can take Ibuprofen/Motrin whenever you get home, if needed for pain.

## 2024-02-23 NOTE — ASU DISCHARGE PLAN (ADULT/PEDIATRIC) - BATHING
Exclusively apply sponge baths to the front of the body (leave the back dry) until cleared by your surgeon./Sponge only

## 2024-02-23 NOTE — ASU DISCHARGE PLAN (ADULT/PEDIATRIC) - NS MD DC FALL RISK RISK
For information on Fall & Injury Prevention, visit: [email or web address] OR  [email or web address] OR  https://www.cdc.gov/steadi/patient.html

## 2024-02-23 NOTE — BRIEF OPERATIVE NOTE - ELECTIVE PROCEDURE
Yes Pt presents to ER with family c/o left foot pain, increased seizures and palpitations. Onset of symptoms began 2 days PTA. Pt is special needs and has history of seizures; this year pt has had five grand mal seizures. Pt dropped a heavy cutting board on her foot 2 days PTA and reports her heart rate feels fast since

## 2024-03-07 ENCOUNTER — TRANSCRIPTION ENCOUNTER (OUTPATIENT)
Age: 64
End: 2024-03-07

## 2024-03-08 ENCOUNTER — OUTPATIENT (OUTPATIENT)
Dept: OUTPATIENT SERVICES | Facility: HOSPITAL | Age: 64
LOS: 1 days | End: 2024-03-08
Payer: COMMERCIAL

## 2024-03-08 ENCOUNTER — TRANSCRIPTION ENCOUNTER (OUTPATIENT)
Age: 64
End: 2024-03-08

## 2024-03-08 VITALS
SYSTOLIC BLOOD PRESSURE: 138 MMHG | OXYGEN SATURATION: 100 % | TEMPERATURE: 98 F | DIASTOLIC BLOOD PRESSURE: 65 MMHG | HEART RATE: 65 BPM | RESPIRATION RATE: 14 BRPM

## 2024-03-08 VITALS
DIASTOLIC BLOOD PRESSURE: 77 MMHG | SYSTOLIC BLOOD PRESSURE: 134 MMHG | RESPIRATION RATE: 16 BRPM | TEMPERATURE: 97 F | OXYGEN SATURATION: 100 % | HEART RATE: 55 BPM | WEIGHT: 199.08 LBS | HEIGHT: 66 IN

## 2024-03-08 DIAGNOSIS — R15.9 FULL INCONTINENCE OF FECES: ICD-10-CM

## 2024-03-08 DIAGNOSIS — Z98.890 OTHER SPECIFIED POSTPROCEDURAL STATES: Chronic | ICD-10-CM

## 2024-03-08 PROCEDURE — C1787: CPT

## 2024-03-08 PROCEDURE — C1820: CPT

## 2024-03-08 PROCEDURE — 64590 INS/RPL PRPH SAC/GSTR NPG/R: CPT

## 2024-03-08 DEVICE — SYS CHARGER NEUROSTIM: Type: IMPLANTABLE DEVICE | Status: FUNCTIONAL

## 2024-03-08 DEVICE — STIM NEURO RECHARGEABLE R20: Type: IMPLANTABLE DEVICE | Status: FUNCTIONAL

## 2024-03-08 DEVICE — CONTROL REMOTE AXONICS DISP NON STRL: Type: IMPLANTABLE DEVICE | Status: FUNCTIONAL

## 2024-03-08 RX ORDER — CEPHALEXIN 500 MG
1 CAPSULE ORAL
Qty: 6 | Refills: 0
Start: 2024-03-08 | End: 2024-03-10

## 2024-03-08 RX ORDER — FENTANYL CITRATE 50 UG/ML
25 INJECTION INTRAVENOUS
Refills: 0 | Status: DISCONTINUED | OUTPATIENT
Start: 2024-03-08 | End: 2024-03-08

## 2024-03-08 RX ORDER — ONDANSETRON 8 MG/1
4 TABLET, FILM COATED ORAL ONCE
Refills: 0 | Status: DISCONTINUED | OUTPATIENT
Start: 2024-03-08 | End: 2024-03-22

## 2024-03-08 RX ORDER — SODIUM CHLORIDE 9 MG/ML
1000 INJECTION, SOLUTION INTRAVENOUS
Refills: 0 | Status: DISCONTINUED | OUTPATIENT
Start: 2024-03-08 | End: 2024-03-22

## 2024-03-08 RX ORDER — SODIUM CHLORIDE 9 MG/ML
3 INJECTION INTRAMUSCULAR; INTRAVENOUS; SUBCUTANEOUS EVERY 8 HOURS
Refills: 0 | Status: DISCONTINUED | OUTPATIENT
Start: 2024-03-08 | End: 2024-03-22

## 2024-03-08 RX ORDER — TRAMADOL HYDROCHLORIDE 50 MG/1
1 TABLET ORAL
Refills: 0 | DISCHARGE

## 2024-03-08 NOTE — ASU PATIENT PROFILE, ADULT - FALL HARM RISK - UNIVERSAL INTERVENTIONS
Bed in lowest position, wheels locked, appropriate side rails in place/Call bell, personal items and telephone in reach/Instruct patient to call for assistance before getting out of bed or chair/Non-slip footwear when patient is out of bed/[place] to call system/Physically safe environment - no spills, clutter or unnecessary equipment/Purposeful Proactive Rounding/Room/bathroom lighting operational, light cord in reach

## 2024-03-08 NOTE — PRE-ANESTHESIA EVALUATION ADULT - NSANTHPMHFT_GEN_ALL_CORE
63F with PMH s/f ascending aortic aneurysm (stable 4.1 cm under surveillance as of 12/2023), hx smoking (20 years x 6 cigarettes a day, quit 2 years ago, denies COPD) who has been experiencing fecal incontinence and now presents for stage 2 Sacral Nerve Stimulation.

## 2024-03-08 NOTE — ASU DISCHARGE PLAN (ADULT/PEDIATRIC) - NS MD DC FALL RISK RISK
For information on Fall & Injury Prevention, visit: [email or web address] OR  [email or web address] OR  https://www.cdc.gov/steadi/patient.html

## 2024-03-08 NOTE — BRIEF OPERATIVE NOTE - OPERATION/FINDINGS
Incision was made along the previous scar of the first sacral stimulator stage. The external wires were removed and a permanent battery was placed. The sacral nerve stimulator was confirmed to be operational.

## 2024-03-08 NOTE — ASU DISCHARGE PLAN (ADULT/PEDIATRIC) - CARE PROVIDER_API CALL
Maggie Cardoso  Surgery  [address] [place] [address]  Phone: [phone]  Fax: [phone]  Follow Up Time: 2 weeks

## 2024-03-08 NOTE — ASU DISCHARGE PLAN (ADULT/PEDIATRIC) - CALL YOUR DOCTOR IF YOU HAVE ANY OF THE FOLLOWING:
Bleeding that does not stop/Pain not relieved by Medications/Fever greater than (need to indicate Fahrenheit or Celsius)/Wound/Surgical Site with redness, or foul smelling discharge or pus/Numbness, tingling, color or temperature change to extremity/Excessive diarrhea

## 2024-03-14 ENCOUNTER — NON-APPOINTMENT (OUTPATIENT)
Age: 64
End: 2024-03-14

## 2024-03-14 ENCOUNTER — APPOINTMENT (OUTPATIENT)
Dept: INTERNAL MEDICINE | Facility: CLINIC | Age: 64
End: 2024-03-14
Payer: COMMERCIAL

## 2024-03-14 ENCOUNTER — LABORATORY RESULT (OUTPATIENT)
Age: 64
End: 2024-03-14

## 2024-03-14 VITALS
WEIGHT: 198 LBS | HEIGHT: 66 IN | HEART RATE: 78 BPM | OXYGEN SATURATION: 98 % | TEMPERATURE: 97.1 F | BODY MASS INDEX: 31.82 KG/M2

## 2024-03-14 DIAGNOSIS — Z00.00 ENCOUNTER FOR GENERAL ADULT MEDICAL EXAMINATION W/OUT ABNORMAL FINDINGS: ICD-10-CM

## 2024-03-14 DIAGNOSIS — J30.9 ALLERGIC RHINITIS, UNSPECIFIED: ICD-10-CM

## 2024-03-14 DIAGNOSIS — R91.1 SOLITARY PULMONARY NODULE: ICD-10-CM

## 2024-03-14 DIAGNOSIS — R06.09 OTHER FORMS OF DYSPNEA: ICD-10-CM

## 2024-03-14 DIAGNOSIS — F39 UNSPECIFIED MOOD [AFFECTIVE] DISORDER: ICD-10-CM

## 2024-03-14 DIAGNOSIS — I71.20 THORACIC AORTIC ANEURYSM, WITHOUT RUPTURE, UNSPECIFIED: ICD-10-CM

## 2024-03-14 DIAGNOSIS — U07.1 COVID-19: ICD-10-CM

## 2024-03-14 DIAGNOSIS — R53.83 OTHER FATIGUE: ICD-10-CM

## 2024-03-14 PROCEDURE — 36415 COLL VENOUS BLD VENIPUNCTURE: CPT

## 2024-03-14 PROCEDURE — 93000 ELECTROCARDIOGRAM COMPLETE: CPT

## 2024-03-14 PROCEDURE — 99396 PREV VISIT EST AGE 40-64: CPT

## 2024-03-14 NOTE — HISTORY OF PRESENT ILLNESS
[de-identified] : This is a 64   year-old patient with a history of fatigue, mild mitral regurg, pulmonary nodule and recently found 4.1 cm thoracic ascending aneurysm who is here today for her annual well examination.

## 2024-03-14 NOTE — PHYSICAL EXAM
[Declined Breast Exam] : declined breast exam  [Declined Rectal Exam] : declined rectal exam [Normal] : no rash [de-identified] : Grade 1/6 systolic ejection murmur

## 2024-03-14 NOTE — ASSESSMENT
[FreeTextEntry1] : Problems History of smoking Thoracic aortic aneurysm Pulmonary nodule Mild mitral regurgitation Mood disorder Assessment The patient's vital signs and physical examination were normal.  Noncontrast CT was ordered to assess her pulmonary nodule.  In addition she was advised of the need for an echocardiogram and mammogram . She will also need a yearly assessment of her thoracic aortic aneurysm .  In addition I advised the patient to lose weight as she was close to 200 pounds today.  I started her on Wellbutrin  mg daily because of her mood disorder.

## 2024-03-14 NOTE — HEALTH RISK ASSESSMENT
[No] : No [Never (0 pts)] : Never (0 points) [No falls in past year] : Patient reported no falls in the past year [0] : 2) Feeling down, depressed, or hopeless: Not at all (0) [PHQ-2 Negative - No further assessment needed] : PHQ-2 Negative - No further assessment needed [ULL1Teily] : 0 [Fully functional (using the telephone, shopping, preparing meals, housekeeping, doing laundry, using] : Fully functional and needs no help or supervision to perform IADLs (using the telephone, shopping, preparing meals, housekeeping, doing laundry, using transportation, managing medications and managing finances) [Fully functional (bathing, dressing, toileting, transferring, walking, feeding)] : Fully functional (bathing, dressing, toileting, transferring, walking, feeding) [Current] : Current

## 2024-03-15 DIAGNOSIS — U07.1 COVID-19: ICD-10-CM

## 2024-03-15 LAB
ALBUMIN SERPL ELPH-MCNC: 4.6 G/DL
ALP BLD-CCNC: 98 U/L
ALT SERPL-CCNC: 17 U/L
ANION GAP SERPL CALC-SCNC: 13 MMOL/L
APPEARANCE: ABNORMAL
AST SERPL-CCNC: 19 U/L
BACTERIA: ABNORMAL /HPF
BASOPHILS # BLD AUTO: 0.03 K/UL
BASOPHILS NFR BLD AUTO: 0.4 %
BILIRUB SERPL-MCNC: 0.3 MG/DL
BILIRUBIN URINE: NEGATIVE
BLOOD URINE: NEGATIVE
BUN SERPL-MCNC: 12 MG/DL
CALCIUM SERPL-MCNC: 10 MG/DL
CAST: 0 /LPF
CHLORIDE SERPL-SCNC: 101 MMOL/L
CHOLEST SERPL-MCNC: 213 MG/DL
CO2 SERPL-SCNC: 25 MMOL/L
COLOR: YELLOW
CREAT SERPL-MCNC: 0.67 MG/DL
EGFR: 98 ML/MIN/1.73M2
EOSINOPHIL # BLD AUTO: 0.05 K/UL
EOSINOPHIL NFR BLD AUTO: 0.7 %
EPITHELIAL CELLS: 16 /HPF
ESTIMATED AVERAGE GLUCOSE: 111 MG/DL
FOLATE SERPL-MCNC: >20 NG/ML
GLUCOSE QUALITATIVE U: NEGATIVE MG/DL
GLUCOSE SERPL-MCNC: 81 MG/DL
HBA1C MFR BLD HPLC: 5.5 %
HCT VFR BLD CALC: 39.1 %
HDLC SERPL-MCNC: 67 MG/DL
HGB BLD-MCNC: 12.6 G/DL
IMM GRANULOCYTES NFR BLD AUTO: 0.4 %
INFLUENZA A RESULT: NOT DETECTED
INFLUENZA B RESULT: NOT DETECTED
KETONES URINE: NEGATIVE MG/DL
LDLC SERPL CALC-MCNC: 130 MG/DL
LEUKOCYTE ESTERASE URINE: ABNORMAL
LYMPHOCYTES # BLD AUTO: 1.42 K/UL
LYMPHOCYTES NFR BLD AUTO: 19.4 %
MAN DIFF?: NORMAL
MCHC RBC-ENTMCNC: 30 PG
MCHC RBC-ENTMCNC: 32.2 GM/DL
MCV RBC AUTO: 93.1 FL
MICROSCOPIC-UA: NORMAL
MONOCYTES # BLD AUTO: 0.68 K/UL
MONOCYTES NFR BLD AUTO: 9.3 %
NEUTROPHILS # BLD AUTO: 5.1 K/UL
NEUTROPHILS NFR BLD AUTO: 69.8 %
NITRITE URINE: NEGATIVE
NONHDLC SERPL-MCNC: 146 MG/DL
PH URINE: 8
PLATELET # BLD AUTO: 337 K/UL
POTASSIUM SERPL-SCNC: 4.9 MMOL/L
PROT SERPL-MCNC: 7.3 G/DL
PROTEIN URINE: NORMAL MG/DL
RBC # BLD: 4.2 M/UL
RBC # FLD: 14.2 %
RED BLOOD CELLS URINE: 4 /HPF
RESP SYN VIRUS RESULT: NOT DETECTED
SARS-COV-2 RESULT: DETECTED
SODIUM SERPL-SCNC: 138 MMOL/L
SPECIFIC GRAVITY URINE: 1.02
T3RU NFR SERPL: 1.1 TBI
T4 SERPL-MCNC: 8.9 UG/DL
TRIGL SERPL-MCNC: 90 MG/DL
TSH SERPL-ACNC: 1.48 UIU/ML
URATE SERPL-MCNC: 2.9 MG/DL
UROBILINOGEN URINE: 0.2 MG/DL
VIT B12 SERPL-MCNC: 859 PG/ML
WBC # FLD AUTO: 7.31 K/UL
WHITE BLOOD CELLS URINE: 25 /HPF

## 2024-03-15 RX ORDER — NIRMATRELVIR AND RITONAVIR 300-100 MG
20 X 150 MG & KIT ORAL
Qty: 1 | Refills: 0 | Status: ACTIVE | COMMUNITY
Start: 2024-03-15 | End: 1900-01-01

## 2024-03-25 ENCOUNTER — APPOINTMENT (OUTPATIENT)
Dept: RADIOLOGY | Facility: CLINIC | Age: 64
End: 2024-03-25
Payer: COMMERCIAL

## 2024-03-25 ENCOUNTER — NON-APPOINTMENT (OUTPATIENT)
Age: 64
End: 2024-03-25

## 2024-03-25 VITALS — BODY MASS INDEX: 31.82 KG/M2 | HEIGHT: 66 IN | WEIGHT: 198 LBS

## 2024-03-25 DIAGNOSIS — Z87.891 PERSONAL HISTORY OF NICOTINE DEPENDENCE: ICD-10-CM

## 2024-03-25 PROCEDURE — 77080 DXA BONE DENSITY AXIAL: CPT

## 2024-03-25 NOTE — HISTORY OF PRESENT ILLNESS
[Former] : Former [>=20 Pack-Years] : Twenty pack years or greater smoking history: Yes [TextBox_13] : Referred by Dr. Faria  Ms. EVGENY GAYTAN is a 64 year old woman with a history of nicotine dependence  Unable to reach pt.  Reviewed and confirmed that the patient meets screening eligibility criteria:  -Age: 64 years old  Smoking status:  -Former smoker. Quit 2020, 20 pack years, smoked between ages [age]     No personal history of lung cancer. No  s/s of lung cancer. No lung cancer in a 1st degree relative.No history of lung disease.No  history of occupational exposures. H/o aortic aneurysm.  [YearQuit] : 2020 [PacksperYear] : 20

## 2024-03-26 ENCOUNTER — TRANSCRIPTION ENCOUNTER (OUTPATIENT)
Age: 64
End: 2024-03-26

## 2024-03-28 RX ORDER — DOXYCYCLINE HYCLATE 100 MG/1
100 CAPSULE ORAL
Qty: 14 | Refills: 0 | Status: ACTIVE | COMMUNITY
Start: 2024-03-28 | End: 1900-01-01

## 2024-04-08 ENCOUNTER — APPOINTMENT (OUTPATIENT)
Dept: CT IMAGING | Facility: CLINIC | Age: 64
End: 2024-04-08

## 2024-04-25 NOTE — H&P PST ADULT - NS PRO TALK SOMEONE YN
X Size Of Lesion In Cm (Optional): 0
Introduction Text (Please End With A Colon): The following procedure was deferred:
Detail Level: Detailed
no

## 2024-05-09 DIAGNOSIS — E78.00 PURE HYPERCHOLESTEROLEMIA, UNSPECIFIED: ICD-10-CM

## 2024-05-15 LAB
ALBUMIN SERPL ELPH-MCNC: 4.3 G/DL
ALP BLD-CCNC: 113 U/L
ALT SERPL-CCNC: 13 U/L
ANION GAP SERPL CALC-SCNC: 12 MMOL/L
AST SERPL-CCNC: 18 U/L
BASOPHILS # BLD AUTO: 0.04 K/UL
BASOPHILS NFR BLD AUTO: 0.6 %
BILIRUB SERPL-MCNC: <0.2 MG/DL
BUN SERPL-MCNC: 14 MG/DL
CALCIUM SERPL-MCNC: 9.7 MG/DL
CHLORIDE SERPL-SCNC: 104 MMOL/L
CHOLEST SERPL-MCNC: 203 MG/DL
CO2 SERPL-SCNC: 25 MMOL/L
CREAT SERPL-MCNC: 0.69 MG/DL
EGFR: 97 ML/MIN/1.73M2
EOSINOPHIL # BLD AUTO: 0.1 K/UL
EOSINOPHIL NFR BLD AUTO: 1.4 %
GLUCOSE SERPL-MCNC: 95 MG/DL
HCT VFR BLD CALC: 37.9 %
HDLC SERPL-MCNC: 64 MG/DL
HGB BLD-MCNC: 12 G/DL
IMM GRANULOCYTES NFR BLD AUTO: 0.3 %
LDLC SERPL CALC-MCNC: 122 MG/DL
LYMPHOCYTES # BLD AUTO: 2.57 K/UL
LYMPHOCYTES NFR BLD AUTO: 35.8 %
MAN DIFF?: NORMAL
MCHC RBC-ENTMCNC: 30.1 PG
MCHC RBC-ENTMCNC: 31.7 GM/DL
MCV RBC AUTO: 95 FL
MONOCYTES # BLD AUTO: 0.64 K/UL
MONOCYTES NFR BLD AUTO: 8.9 %
NEUTROPHILS # BLD AUTO: 3.8 K/UL
NEUTROPHILS NFR BLD AUTO: 53 %
NONHDLC SERPL-MCNC: 138 MG/DL
PLATELET # BLD AUTO: 328 K/UL
POTASSIUM SERPL-SCNC: 5 MMOL/L
PROT SERPL-MCNC: 6.7 G/DL
RBC # BLD: 3.99 M/UL
RBC # FLD: 14.6 %
SODIUM SERPL-SCNC: 140 MMOL/L
TRIGL SERPL-MCNC: 93 MG/DL
WBC # FLD AUTO: 7.17 K/UL

## 2024-05-16 ENCOUNTER — TRANSCRIPTION ENCOUNTER (OUTPATIENT)
Age: 64
End: 2024-05-16

## 2024-06-14 RX ORDER — SERTRALINE HYDROCHLORIDE 50 MG/1
50 TABLET, FILM COATED ORAL
Qty: 30 | Refills: 3 | Status: ACTIVE | COMMUNITY
Start: 2024-03-14 | End: 1900-01-01

## 2024-08-09 ENCOUNTER — APPOINTMENT (OUTPATIENT)
Dept: INTERNAL MEDICINE | Facility: CLINIC | Age: 64
End: 2024-08-09

## 2024-08-09 PROBLEM — E66.9 OBESITY (BMI 30-39.9): Status: ACTIVE | Noted: 2024-08-09

## 2024-08-09 PROCEDURE — G2211 COMPLEX E/M VISIT ADD ON: CPT

## 2024-08-09 PROCEDURE — 36415 COLL VENOUS BLD VENIPUNCTURE: CPT

## 2024-08-09 PROCEDURE — 99212 OFFICE O/P EST SF 10 MIN: CPT

## 2024-08-09 NOTE — HISTORY OF PRESENT ILLNESS
[de-identified] : This is a 64-year-old patient with a history of obesity, mood disorder, history of heavy smoking who is here today because she would like to be put on a weight reducing medication

## 2024-08-09 NOTE — ASSESSMENT
[FreeTextEntry1] : I discussed's  zepbound with the patient and advised her as to the side effects in great detail including nausea vomiting severe abdominal pain and rarely intestinal.  I started her zepbound 2 1/2 milligrams weekly with instructions to return for bloods in 6 weeks

## 2024-09-17 ENCOUNTER — APPOINTMENT (OUTPATIENT)
Dept: INTERNAL MEDICINE | Facility: CLINIC | Age: 64
End: 2024-09-17
Payer: COMMERCIAL

## 2024-09-17 VITALS — HEIGHT: 66 IN | BODY MASS INDEX: 29.57 KG/M2 | WEIGHT: 184 LBS

## 2024-09-17 VITALS — SYSTOLIC BLOOD PRESSURE: 120 MMHG | DIASTOLIC BLOOD PRESSURE: 70 MMHG

## 2024-09-17 DIAGNOSIS — E66.9 OBESITY, UNSPECIFIED: ICD-10-CM

## 2024-09-17 DIAGNOSIS — F39 UNSPECIFIED MOOD [AFFECTIVE] DISORDER: ICD-10-CM

## 2024-09-17 PROCEDURE — G2211 COMPLEX E/M VISIT ADD ON: CPT

## 2024-09-17 PROCEDURE — 36415 COLL VENOUS BLD VENIPUNCTURE: CPT

## 2024-09-17 PROCEDURE — 99212 OFFICE O/P EST SF 10 MIN: CPT

## 2024-09-17 NOTE — ASSESSMENT
[FreeTextEntry1] : The patient blood pressure and physical examination are normal.  Bloods were drawn to check her A1c and liver enzymes.  I increased her Zepbound to 5 mg daily and advised her to return in 1 month

## 2024-09-17 NOTE — HISTORY OF PRESENT ILLNESS
[de-identified] : This is a 64-year-old patient with a history of mood disorder and morbid obesity who recently was started on Zepbound 2-1/2 mg daily and has lost a fair amount of weight and she is here for reevaluation as to whether or not the dose of her meds should be placed

## 2024-09-18 LAB
ALBUMIN SERPL ELPH-MCNC: 4.5 G/DL
ALP BLD-CCNC: 96 U/L
ALT SERPL-CCNC: 14 U/L
AST SERPL-CCNC: 20 U/L
BILIRUB DIRECT SERPL-MCNC: 0.1 MG/DL
BILIRUB INDIRECT SERPL-MCNC: 0.2 MG/DL
BILIRUB SERPL-MCNC: 0.3 MG/DL
CHOLEST SERPL-MCNC: 199 MG/DL
ESTIMATED AVERAGE GLUCOSE: 105 MG/DL
HBA1C MFR BLD HPLC: 5.3 %
HDLC SERPL-MCNC: 71 MG/DL
LDLC SERPL CALC-MCNC: 118 MG/DL
NONHDLC SERPL-MCNC: 128 MG/DL
PROT SERPL-MCNC: 7.1 G/DL
TRIGL SERPL-MCNC: 51 MG/DL

## 2024-09-19 LAB
APPEARANCE: CLEAR
BACTERIA: ABNORMAL /HPF
BILIRUBIN URINE: NEGATIVE
BLOOD URINE: NEGATIVE
CALCIUM OXALATE CRYSTALS: PRESENT
CAST: 1 /LPF
COLOR: YELLOW
EPITHELIAL CELLS: 5 /HPF
GLUCOSE QUALITATIVE U: NEGATIVE MG/DL
KETONES URINE: NEGATIVE MG/DL
LEUKOCYTE ESTERASE URINE: ABNORMAL
MICROSCOPIC-UA: NORMAL
MUCUS: PRESENT
NITRITE URINE: NEGATIVE
PH URINE: 5.5
PROTEIN URINE: NEGATIVE MG/DL
RED BLOOD CELLS URINE: 1 /HPF
REVIEW: NORMAL
SPECIFIC GRAVITY URINE: 1.02
UROBILINOGEN URINE: 0.2 MG/DL
WHITE BLOOD CELLS URINE: 2 /HPF

## 2024-10-17 ENCOUNTER — APPOINTMENT (OUTPATIENT)
Dept: INTERNAL MEDICINE | Facility: CLINIC | Age: 64
End: 2024-10-17

## 2024-10-21 ENCOUNTER — APPOINTMENT (OUTPATIENT)
Dept: INTERNAL MEDICINE | Facility: CLINIC | Age: 64
End: 2024-10-21
Payer: COMMERCIAL

## 2024-10-21 VITALS
HEART RATE: 66 BPM | TEMPERATURE: 98.1 F | SYSTOLIC BLOOD PRESSURE: 112 MMHG | DIASTOLIC BLOOD PRESSURE: 75 MMHG | BODY MASS INDEX: 28.12 KG/M2 | WEIGHT: 175 LBS | OXYGEN SATURATION: 98 % | HEIGHT: 66 IN

## 2024-10-21 DIAGNOSIS — E66.9 OBESITY, UNSPECIFIED: ICD-10-CM

## 2024-10-21 PROCEDURE — G2211 COMPLEX E/M VISIT ADD ON: CPT

## 2024-10-21 PROCEDURE — 99214 OFFICE O/P EST MOD 30 MIN: CPT

## 2024-10-23 ENCOUNTER — NON-APPOINTMENT (OUTPATIENT)
Age: 64
End: 2024-10-23

## 2024-11-07 ENCOUNTER — RX RENEWAL (OUTPATIENT)
Age: 64
End: 2024-11-07

## 2024-11-07 RX ORDER — TIRZEPATIDE 5 MG/.5ML
5 INJECTION, SOLUTION SUBCUTANEOUS
Qty: 2 | Refills: 0 | Status: ACTIVE | COMMUNITY
Start: 2024-11-07 | End: 1900-01-01

## 2025-01-06 ENCOUNTER — RX RENEWAL (OUTPATIENT)
Age: 65
End: 2025-01-06

## 2025-01-06 DIAGNOSIS — E78.00 PURE HYPERCHOLESTEROLEMIA, UNSPECIFIED: ICD-10-CM

## 2025-01-07 ENCOUNTER — LABORATORY RESULT (OUTPATIENT)
Age: 65
End: 2025-01-07

## 2025-01-08 LAB
ALBUMIN SERPL ELPH-MCNC: 4.1 G/DL
ALP BLD-CCNC: 92 U/L
ALT SERPL-CCNC: 8 U/L
ANION GAP SERPL CALC-SCNC: 12 MMOL/L
APPEARANCE: ABNORMAL
AST SERPL-CCNC: 14 U/L
BACTERIA: ABNORMAL /HPF
BASOPHILS # BLD AUTO: 0.04 K/UL
BASOPHILS NFR BLD AUTO: 0.5 %
BILIRUB SERPL-MCNC: 0.2 MG/DL
BILIRUBIN URINE: NEGATIVE
BLOOD URINE: NEGATIVE
BUN SERPL-MCNC: 9 MG/DL
CALCIUM OXALATE CRYSTALS: PRESENT
CALCIUM SERPL-MCNC: 9.7 MG/DL
CAST: 1 /LPF
CHLORIDE SERPL-SCNC: 104 MMOL/L
CHOLEST SERPL-MCNC: 170 MG/DL
CO2 SERPL-SCNC: 26 MMOL/L
COLOR: YELLOW
CREAT SERPL-MCNC: 0.6 MG/DL
EGFR: 101 ML/MIN/1.73M2
EOSINOPHIL # BLD AUTO: 0.09 K/UL
EOSINOPHIL NFR BLD AUTO: 1.2 %
EPITHELIAL CELLS: 11 /HPF
ESTIMATED AVERAGE GLUCOSE: 105 MG/DL
GLUCOSE QUALITATIVE U: NEGATIVE MG/DL
GLUCOSE SERPL-MCNC: 83 MG/DL
HBA1C MFR BLD HPLC: 5.3 %
HCT VFR BLD CALC: 36.8 %
HDLC SERPL-MCNC: 47 MG/DL
HGB BLD-MCNC: 11.7 G/DL
HYALINE CASTS: PRESENT
IMM GRANULOCYTES NFR BLD AUTO: 0.3 %
KETONES URINE: NEGATIVE MG/DL
LDLC SERPL CALC-MCNC: 107 MG/DL
LEUKOCYTE ESTERASE URINE: ABNORMAL
LYMPHOCYTES # BLD AUTO: 2.59 K/UL
LYMPHOCYTES NFR BLD AUTO: 33.8 %
MAN DIFF?: NORMAL
MCHC RBC-ENTMCNC: 30.8 PG
MCHC RBC-ENTMCNC: 31.8 G/DL
MCV RBC AUTO: 96.8 FL
MICROSCOPIC-UA: NORMAL
MONOCYTES # BLD AUTO: 0.64 K/UL
MONOCYTES NFR BLD AUTO: 8.4 %
MUCUS: PRESENT
NEUTROPHILS # BLD AUTO: 4.28 K/UL
NEUTROPHILS NFR BLD AUTO: 55.8 %
NITRITE URINE: NEGATIVE
NONHDLC SERPL-MCNC: 123 MG/DL
PH URINE: 6.5
PLATELET # BLD AUTO: 349 K/UL
POTASSIUM SERPL-SCNC: 4.4 MMOL/L
PROT SERPL-MCNC: 6.6 G/DL
PROTEIN URINE: NEGATIVE MG/DL
RBC # BLD: 3.8 M/UL
RBC # FLD: 13.9 %
RED BLOOD CELLS URINE: 10 /HPF
REVIEW: NORMAL
SODIUM SERPL-SCNC: 142 MMOL/L
SPECIFIC GRAVITY URINE: 1.02
T3RU NFR SERPL: 1 TBI
T4 SERPL-MCNC: 8.7 UG/DL
TRANSITIONAL EPITHELIAL CELLS: PRESENT
TRIGL SERPL-MCNC: 86 MG/DL
TSH SERPL-ACNC: 1.34 UIU/ML
URATE SERPL-MCNC: 2.7 MG/DL
UROBILINOGEN URINE: 1 MG/DL
WBC # FLD AUTO: 7.66 K/UL
WHITE BLOOD CELLS URINE: 3 /HPF

## 2025-01-09 DIAGNOSIS — R82.90 UNSPECIFIED ABNORMAL FINDINGS IN URINE: ICD-10-CM

## 2025-01-11 LAB — BACTERIA UR CULT: NORMAL

## 2025-02-03 ENCOUNTER — RX RENEWAL (OUTPATIENT)
Age: 65
End: 2025-02-03

## 2025-02-04 ENCOUNTER — APPOINTMENT (OUTPATIENT)
Dept: INTERNAL MEDICINE | Facility: CLINIC | Age: 65
End: 2025-02-04
Payer: COMMERCIAL

## 2025-02-04 ENCOUNTER — RX RENEWAL (OUTPATIENT)
Age: 65
End: 2025-02-04

## 2025-02-04 VITALS — WEIGHT: 158 LBS | BODY MASS INDEX: 25.39 KG/M2 | HEIGHT: 66 IN

## 2025-02-04 VITALS — SYSTOLIC BLOOD PRESSURE: 110 MMHG | DIASTOLIC BLOOD PRESSURE: 70 MMHG

## 2025-02-04 DIAGNOSIS — I71.20 THORACIC AORTIC ANEURYSM, WITHOUT RUPTURE, UNSPECIFIED: ICD-10-CM

## 2025-02-04 DIAGNOSIS — R91.1 SOLITARY PULMONARY NODULE: ICD-10-CM

## 2025-02-04 DIAGNOSIS — E78.00 PURE HYPERCHOLESTEROLEMIA, UNSPECIFIED: ICD-10-CM

## 2025-02-04 DIAGNOSIS — E66.9 OBESITY, UNSPECIFIED: ICD-10-CM

## 2025-02-04 PROCEDURE — 99214 OFFICE O/P EST MOD 30 MIN: CPT

## 2025-02-04 PROCEDURE — G2211 COMPLEX E/M VISIT ADD ON: CPT

## 2025-02-04 RX ORDER — ALPRAZOLAM 0.25 MG/1
0.25 TABLET ORAL TWICE DAILY
Qty: 30 | Refills: 0 | Status: ACTIVE | COMMUNITY
Start: 2025-02-04 | End: 1900-01-01

## 2025-02-05 ENCOUNTER — NON-APPOINTMENT (OUTPATIENT)
Age: 65
End: 2025-02-05

## 2025-02-05 VITALS — BODY MASS INDEX: 25.39 KG/M2 | HEIGHT: 66 IN | WEIGHT: 158 LBS

## 2025-02-05 DIAGNOSIS — Z87.891 PERSONAL HISTORY OF NICOTINE DEPENDENCE: ICD-10-CM

## 2025-02-05 LAB
APPEARANCE: ABNORMAL
BACTERIA: ABNORMAL /HPF
BILIRUBIN URINE: NEGATIVE
BLOOD URINE: NEGATIVE
CALCIUM OXALATE CRYSTALS: PRESENT
CAST: 3 /LPF
COLOR: YELLOW
EPITHELIAL CELLS: 12 /HPF
GLUCOSE QUALITATIVE U: NEGATIVE MG/DL
KETONES URINE: ABNORMAL MG/DL
LEUKOCYTE ESTERASE URINE: ABNORMAL
MICROSCOPIC-UA: NORMAL
MUCUS: PRESENT
NITRITE URINE: NEGATIVE
PH URINE: 5
PROTEIN URINE: NORMAL MG/DL
RED BLOOD CELLS URINE: 1 /HPF
REVIEW: NORMAL
SPECIFIC GRAVITY URINE: >1.03
UROBILINOGEN URINE: 1 MG/DL
WHITE BLOOD CELLS URINE: 9 /HPF

## 2025-02-06 LAB — BACTERIA UR CULT: NORMAL

## 2025-02-15 ENCOUNTER — APPOINTMENT (OUTPATIENT)
Dept: CT IMAGING | Facility: CLINIC | Age: 65
End: 2025-02-15

## 2025-02-15 PROCEDURE — 71271 CT THORAX LUNG CANCER SCR C-: CPT

## 2025-02-24 DIAGNOSIS — A49.9 URINARY TRACT INFECTION, SITE NOT SPECIFIED: ICD-10-CM

## 2025-02-24 DIAGNOSIS — N39.0 URINARY TRACT INFECTION, SITE NOT SPECIFIED: ICD-10-CM

## 2025-03-17 ENCOUNTER — APPOINTMENT (OUTPATIENT)
Dept: INTERNAL MEDICINE | Facility: CLINIC | Age: 65
End: 2025-03-17
Payer: COMMERCIAL

## 2025-03-17 ENCOUNTER — NON-APPOINTMENT (OUTPATIENT)
Age: 65
End: 2025-03-17

## 2025-03-17 VITALS
HEIGHT: 66 IN | DIASTOLIC BLOOD PRESSURE: 70 MMHG | SYSTOLIC BLOOD PRESSURE: 110 MMHG | WEIGHT: 151 LBS | BODY MASS INDEX: 24.27 KG/M2

## 2025-03-17 DIAGNOSIS — Z87.898 PERSONAL HISTORY OF OTHER SPECIFIED CONDITIONS: ICD-10-CM

## 2025-03-17 DIAGNOSIS — E66.9 OBESITY, UNSPECIFIED: ICD-10-CM

## 2025-03-17 DIAGNOSIS — S62.102A FRACTURE OF UNSPECIFIED CARPAL BONE, LEFT WRIST, INITIAL ENCOUNTER FOR CLOSED FRACTURE: ICD-10-CM

## 2025-03-17 DIAGNOSIS — Z87.09 PERSONAL HISTORY OF OTHER DISEASES OF THE RESPIRATORY SYSTEM: ICD-10-CM

## 2025-03-17 DIAGNOSIS — U07.1 COVID-19: ICD-10-CM

## 2025-03-17 DIAGNOSIS — Z01.818 ENCOUNTER FOR OTHER PREPROCEDURAL EXAMINATION: ICD-10-CM

## 2025-03-17 DIAGNOSIS — Z87.39 PERSONAL HISTORY OF OTHER DISEASES OF THE MUSCULOSKELETAL SYSTEM AND CONNECTIVE TISSUE: ICD-10-CM

## 2025-03-17 DIAGNOSIS — F39 UNSPECIFIED MOOD [AFFECTIVE] DISORDER: ICD-10-CM

## 2025-03-17 DIAGNOSIS — E78.00 PURE HYPERCHOLESTEROLEMIA, UNSPECIFIED: ICD-10-CM

## 2025-03-17 DIAGNOSIS — Z77.29 CONTACT WITH AND (SUSPECTED) EXPOSURE TO OTHER HAZARDOUS SUBSTANCES: ICD-10-CM

## 2025-03-17 DIAGNOSIS — S81.819A LACERATION W/OUT FOREIGN BODY, UNSPECIFIED LOWER LEG, INITIAL ENCOUNTER: ICD-10-CM

## 2025-03-17 DIAGNOSIS — R82.90 UNSPECIFIED ABNORMAL FINDINGS IN URINE: ICD-10-CM

## 2025-03-17 DIAGNOSIS — Z00.00 ENCOUNTER FOR GENERAL ADULT MEDICAL EXAMINATION W/OUT ABNORMAL FINDINGS: ICD-10-CM

## 2025-03-17 DIAGNOSIS — R06.09 OTHER FORMS OF DYSPNEA: ICD-10-CM

## 2025-03-17 DIAGNOSIS — I71.20 THORACIC AORTIC ANEURYSM, WITHOUT RUPTURE, UNSPECIFIED: ICD-10-CM

## 2025-03-17 DIAGNOSIS — I34.0 NONRHEUMATIC MITRAL (VALVE) INSUFFICIENCY: ICD-10-CM

## 2025-03-17 DIAGNOSIS — Z86.69 PERSONAL HISTORY OF OTHER DISEASES OF THE NERVOUS SYSTEM AND SENSE ORGANS: ICD-10-CM

## 2025-03-17 DIAGNOSIS — R07.89 OTHER CHEST PAIN: ICD-10-CM

## 2025-03-17 PROCEDURE — 93000 ELECTROCARDIOGRAM COMPLETE: CPT

## 2025-03-17 PROCEDURE — 99396 PREV VISIT EST AGE 40-64: CPT

## 2025-03-18 LAB
APPEARANCE: CLEAR
BACTERIA: NEGATIVE /HPF
BILIRUBIN URINE: NEGATIVE
BLOOD URINE: NEGATIVE
CALCIUM OXALATE CRYSTALS: PRESENT
CAST: NORMAL /LPF
COLOR: YELLOW
EPITHELIAL CELLS: 8 /HPF
GLUCOSE QUALITATIVE U: NEGATIVE MG/DL
KETONES URINE: NEGATIVE MG/DL
LEUKOCYTE ESTERASE URINE: ABNORMAL
MICROSCOPIC-UA: NORMAL
MUCUS: PRESENT
NITRITE URINE: NEGATIVE
PH URINE: 6
PROTEIN URINE: NEGATIVE MG/DL
RED BLOOD CELLS URINE: NORMAL /HPF
REVIEW: NORMAL
SPECIFIC GRAVITY URINE: 1.02
UROBILINOGEN URINE: 0.2 MG/DL
WHITE BLOOD CELLS URINE: 3 /HPF

## 2025-03-21 ENCOUNTER — NON-APPOINTMENT (OUTPATIENT)
Age: 65
End: 2025-03-21

## 2025-03-21 DIAGNOSIS — R76.12 NONSPECIFIC REACTION TO CELL MEDIATED IMMUNITY MEASUREMENT OF GAMMA INTERFERON ANTIGEN RESPONSE W/OUT ACTIVE TUBERCULOSIS: ICD-10-CM

## 2025-03-21 LAB
M TB IFN-G BLD-IMP: POSITIVE
QUANTIFERON TB PLUS MITOGEN MINUS NIL: >10 IU/ML
QUANTIFERON TB PLUS NIL: 0.02 IU/ML
QUANTIFERON TB PLUS TB1 MINUS NIL: 1.18 IU/ML
QUANTIFERON TB PLUS TB2 MINUS NIL: 1.02 IU/ML

## 2025-03-28 ENCOUNTER — APPOINTMENT (OUTPATIENT)
Dept: RADIOLOGY | Facility: CLINIC | Age: 65
End: 2025-03-28
Payer: COMMERCIAL

## 2025-03-28 PROCEDURE — 71046 X-RAY EXAM CHEST 2 VIEWS: CPT

## 2025-04-01 ENCOUNTER — RX RENEWAL (OUTPATIENT)
Age: 65
End: 2025-04-01

## 2025-04-08 ENCOUNTER — NON-APPOINTMENT (OUTPATIENT)
Age: 65
End: 2025-04-08

## 2025-04-16 ENCOUNTER — RX RENEWAL (OUTPATIENT)
Age: 65
End: 2025-04-16

## 2025-04-22 ENCOUNTER — APPOINTMENT (OUTPATIENT)
Dept: INFECTIOUS DISEASE | Facility: CLINIC | Age: 65
End: 2025-04-22
Payer: COMMERCIAL

## 2025-04-22 ENCOUNTER — NON-APPOINTMENT (OUTPATIENT)
Age: 65
End: 2025-04-22

## 2025-04-22 VITALS
WEIGHT: 151 LBS | OXYGEN SATURATION: 96 % | TEMPERATURE: 98.4 F | DIASTOLIC BLOOD PRESSURE: 63 MMHG | BODY MASS INDEX: 25.16 KG/M2 | HEIGHT: 65 IN | HEART RATE: 70 BPM | SYSTOLIC BLOOD PRESSURE: 100 MMHG

## 2025-04-22 DIAGNOSIS — R76.12 NONSPECIFIC REACTION TO CELL MEDIATED IMMUNITY MEASUREMENT OF GAMMA INTERFERON ANTIGEN RESPONSE W/OUT ACTIVE TUBERCULOSIS: ICD-10-CM

## 2025-04-22 PROCEDURE — 99203 OFFICE O/P NEW LOW 30 MIN: CPT

## 2025-04-24 LAB
M TB IFN-G BLD-IMP: POSITIVE
QUANTIFERON TB PLUS MITOGEN MINUS NIL: >10 IU/ML
QUANTIFERON TB PLUS NIL: 0.1 IU/ML
QUANTIFERON TB PLUS TB1 MINUS NIL: 3.35 IU/ML
QUANTIFERON TB PLUS TB2 MINUS NIL: 2.48 IU/ML

## 2025-05-05 ENCOUNTER — APPOINTMENT (OUTPATIENT)
Dept: INTERNAL MEDICINE | Facility: CLINIC | Age: 65
End: 2025-05-05
Payer: COMMERCIAL

## 2025-05-05 VITALS — BODY MASS INDEX: 24.66 KG/M2 | WEIGHT: 148 LBS | HEIGHT: 65 IN

## 2025-05-05 VITALS — DIASTOLIC BLOOD PRESSURE: 60 MMHG | SYSTOLIC BLOOD PRESSURE: 110 MMHG

## 2025-05-05 DIAGNOSIS — I71.20 THORACIC AORTIC ANEURYSM, WITHOUT RUPTURE, UNSPECIFIED: ICD-10-CM

## 2025-05-05 DIAGNOSIS — E78.00 PURE HYPERCHOLESTEROLEMIA, UNSPECIFIED: ICD-10-CM

## 2025-05-05 DIAGNOSIS — R91.1 SOLITARY PULMONARY NODULE: ICD-10-CM

## 2025-05-05 DIAGNOSIS — R76.12 NONSPECIFIC REACTION TO CELL MEDIATED IMMUNITY MEASUREMENT OF GAMMA INTERFERON ANTIGEN RESPONSE W/OUT ACTIVE TUBERCULOSIS: ICD-10-CM

## 2025-05-05 PROCEDURE — G2211 COMPLEX E/M VISIT ADD ON: CPT

## 2025-05-05 PROCEDURE — 36415 COLL VENOUS BLD VENIPUNCTURE: CPT

## 2025-05-05 PROCEDURE — 99214 OFFICE O/P EST MOD 30 MIN: CPT

## 2025-05-06 ENCOUNTER — TRANSCRIPTION ENCOUNTER (OUTPATIENT)
Age: 65
End: 2025-05-06

## 2025-05-06 LAB
ALBUMIN SERPL ELPH-MCNC: 4.4 G/DL
ALP BLD-CCNC: 90 U/L
ALT SERPL-CCNC: 14 U/L
ANION GAP SERPL CALC-SCNC: 14 MMOL/L
AST SERPL-CCNC: 20 U/L
BASOPHILS # BLD AUTO: 0.03 K/UL
BASOPHILS NFR BLD AUTO: 0.4 %
BILIRUB SERPL-MCNC: 0.3 MG/DL
BUN SERPL-MCNC: 17 MG/DL
CALCIUM SERPL-MCNC: 9.8 MG/DL
CHLORIDE SERPL-SCNC: 103 MMOL/L
CO2 SERPL-SCNC: 23 MMOL/L
CREAT SERPL-MCNC: 0.59 MG/DL
EGFRCR SERPLBLD CKD-EPI 2021: 101 ML/MIN/1.73M2
EOSINOPHIL # BLD AUTO: 0.04 K/UL
EOSINOPHIL NFR BLD AUTO: 0.5 %
ESTIMATED AVERAGE GLUCOSE: 108 MG/DL
GLUCOSE SERPL-MCNC: 85 MG/DL
HBA1C MFR BLD HPLC: 5.4 %
HCT VFR BLD CALC: 38 %
HGB BLD-MCNC: 12 G/DL
IMM GRANULOCYTES NFR BLD AUTO: 0.3 %
LYMPHOCYTES # BLD AUTO: 2.56 K/UL
LYMPHOCYTES NFR BLD AUTO: 34.4 %
MAN DIFF?: NORMAL
MCHC RBC-ENTMCNC: 30.4 PG
MCHC RBC-ENTMCNC: 31.6 G/DL
MCV RBC AUTO: 96.2 FL
MONOCYTES # BLD AUTO: 0.61 K/UL
MONOCYTES NFR BLD AUTO: 8.2 %
NEUTROPHILS # BLD AUTO: 4.19 K/UL
NEUTROPHILS NFR BLD AUTO: 56.2 %
PLATELET # BLD AUTO: 368 K/UL
POTASSIUM SERPL-SCNC: 4.7 MMOL/L
PROT SERPL-MCNC: 6.8 G/DL
RBC # BLD: 3.95 M/UL
RBC # FLD: 14.1 %
SODIUM SERPL-SCNC: 139 MMOL/L
WBC # FLD AUTO: 7.45 K/UL

## 2025-05-07 ENCOUNTER — NON-APPOINTMENT (OUTPATIENT)
Age: 65
End: 2025-05-07

## 2025-05-07 LAB
HBV SURFACE AB SER QL: REACTIVE
HBV SURFACE AG SER QL: NONREACTIVE
HCV AB SER QL: NONREACTIVE
HCV S/CO RATIO: 0.11 S/CO
HEPATITIS A IGG ANTIBODY: REACTIVE

## 2025-05-13 ENCOUNTER — NON-APPOINTMENT (OUTPATIENT)
Age: 65
End: 2025-05-13

## 2025-05-14 ENCOUNTER — NON-APPOINTMENT (OUTPATIENT)
Age: 65
End: 2025-05-14

## 2025-05-15 ENCOUNTER — APPOINTMENT (OUTPATIENT)
Dept: INFECTIOUS DISEASE | Facility: CLINIC | Age: 65
End: 2025-05-15
Payer: COMMERCIAL

## 2025-05-15 PROCEDURE — 94640 AIRWAY INHALATION TREATMENT: CPT

## 2025-05-19 ENCOUNTER — APPOINTMENT (OUTPATIENT)
Dept: INFECTIOUS DISEASE | Facility: CLINIC | Age: 65
End: 2025-05-19

## 2025-05-19 PROCEDURE — 94640 AIRWAY INHALATION TREATMENT: CPT

## 2025-05-21 LAB — ACID FAST STN SPT: NORMAL

## 2025-05-27 ENCOUNTER — APPOINTMENT (OUTPATIENT)
Dept: INFECTIOUS DISEASE | Facility: CLINIC | Age: 65
End: 2025-05-27
Payer: COMMERCIAL

## 2025-05-27 PROCEDURE — 94640 AIRWAY INHALATION TREATMENT: CPT

## 2025-06-02 ENCOUNTER — RX RENEWAL (OUTPATIENT)
Age: 65
End: 2025-06-02

## 2025-06-11 ENCOUNTER — NON-APPOINTMENT (OUTPATIENT)
Age: 65
End: 2025-06-11

## 2025-06-11 RX ORDER — RIFAMPIN 300 MG/1
300 CAPSULE ORAL
Qty: 180 | Refills: 1 | Status: ACTIVE | COMMUNITY
Start: 2025-06-11 | End: 1900-01-01

## 2025-07-10 ENCOUNTER — APPOINTMENT (OUTPATIENT)
Dept: INTERNAL MEDICINE | Facility: CLINIC | Age: 65
End: 2025-07-10
Payer: COMMERCIAL

## 2025-07-10 VITALS — DIASTOLIC BLOOD PRESSURE: 60 MMHG | SYSTOLIC BLOOD PRESSURE: 110 MMHG

## 2025-07-10 VITALS — HEIGHT: 65 IN | BODY MASS INDEX: 23.99 KG/M2 | WEIGHT: 144 LBS

## 2025-07-10 PROCEDURE — G2211 COMPLEX E/M VISIT ADD ON: CPT

## 2025-07-10 PROCEDURE — 99214 OFFICE O/P EST MOD 30 MIN: CPT

## 2025-07-10 PROCEDURE — 36415 COLL VENOUS BLD VENIPUNCTURE: CPT

## 2025-07-11 LAB
ALBUMIN SERPL ELPH-MCNC: 4.4 G/DL
ALP BLD-CCNC: 96 U/L
ALT SERPL-CCNC: 22 U/L
ANION GAP SERPL CALC-SCNC: 11 MMOL/L
AST SERPL-CCNC: 26 U/L
BILIRUB SERPL-MCNC: 0.2 MG/DL
BUN SERPL-MCNC: 16 MG/DL
CALCIUM SERPL-MCNC: 10.1 MG/DL
CHLORIDE SERPL-SCNC: 105 MMOL/L
CO2 SERPL-SCNC: 23 MMOL/L
CREAT SERPL-MCNC: 0.79 MG/DL
EGFRCR SERPLBLD CKD-EPI 2021: 83 ML/MIN/1.73M2
ESTIMATED AVERAGE GLUCOSE: 108 MG/DL
GLUCOSE SERPL-MCNC: 94 MG/DL
HBA1C MFR BLD HPLC: 5.4 %
POTASSIUM SERPL-SCNC: 4.5 MMOL/L
PROT SERPL-MCNC: 6.9 G/DL
SODIUM SERPL-SCNC: 139 MMOL/L

## 2025-07-14 ENCOUNTER — TRANSCRIPTION ENCOUNTER (OUTPATIENT)
Age: 65
End: 2025-07-14

## 2025-07-14 ENCOUNTER — RX RENEWAL (OUTPATIENT)
Age: 65
End: 2025-07-14

## 2025-07-21 ENCOUNTER — RX RENEWAL (OUTPATIENT)
Age: 65
End: 2025-07-21

## 2025-08-05 ENCOUNTER — APPOINTMENT (OUTPATIENT)
Dept: INTERNAL MEDICINE | Facility: CLINIC | Age: 65
End: 2025-08-05

## 2025-08-26 ENCOUNTER — NON-APPOINTMENT (OUTPATIENT)
Age: 65
End: 2025-08-26

## 2025-09-10 ENCOUNTER — NON-APPOINTMENT (OUTPATIENT)
Age: 65
End: 2025-09-10

## (undated) DEVICE — ELCTR MONOPOLAR STIMULATOR PROBE FLUSH-TIP

## (undated) DEVICE — SOL IRR POUR NS 0.9% 500ML

## (undated) DEVICE — STAPLER SKIN VISI-STAT 35 WIDE

## (undated) DEVICE — SUT POLYSORB 4-0 30" CVF-23

## (undated) DEVICE — DRAPE 1/2 SHEET 40X57"

## (undated) DEVICE — PREP BETADINE SPONGE STICKS

## (undated) DEVICE — VENODYNE/SCD SLEEVE CALF LARGE

## (undated) DEVICE — GLV 6 PROTEXIS (WHITE)

## (undated) DEVICE — NDL HYPO REGULAR BEVEL 22G X 1.5" (TURQUOISE)

## (undated) DEVICE — PACK MINOR

## (undated) DEVICE — SUT POLYSORB 3-0 30" V-20 UNDYED

## (undated) DEVICE — MARKING PEN W RULER

## (undated) DEVICE — DRAPE EQUIPMENT COVER 27"

## (undated) DEVICE — NERVE LOCATOR  III

## (undated) DEVICE — SUT POLYSORB 4-0 30" CVF-23 UNDYED

## (undated) DEVICE — SUT POLYSORB 2-0 30" V-20 UNDYED

## (undated) DEVICE — DRSG DERMABOND 0.7ML

## (undated) DEVICE — FOLEY CATH 2-WAY 16FR 5CC LATEX LUBRICATH

## (undated) DEVICE — POSITIONER FOAM EGG CRATE ULNAR 2PCS (PINK)

## (undated) DEVICE — BAG URINE W METER 2L

## (undated) DEVICE — BLADE SCALPEL SAFETYLOCK #15

## (undated) DEVICE — BLADE SCALPEL SAFETYLOCK #11

## (undated) DEVICE — SUT SURGIPRO 3-0 18" P-12

## (undated) DEVICE — POSITIONER CUSHION INSERT PRONE VIEW LG

## (undated) DEVICE — WARMING BLANKET UPPER ADULT

## (undated) DEVICE — SUT PROLENE 4-0 30" SH-1

## (undated) DEVICE — DRAPE C ARM UNIVERSAL

## (undated) DEVICE — SPECIMEN CONTAINER 100ML

## (undated) DEVICE — SYR LUER LOK 10CC

## (undated) DEVICE — STIMULATOR NERVE EXT

## (undated) DEVICE — SUT SOFSILK 2-0 18" TIES

## (undated) DEVICE — DRAPE CHEST BREAST 106" X 122"

## (undated) DEVICE — NSA-C-ARM: Type: DURABLE MEDICAL EQUIPMENT

## (undated) DEVICE — SUT POLYSORB 4-0 18" P-12 UNDYED

## (undated) DEVICE — SOL IRR POUR H2O 250ML

## (undated) DEVICE — DRAPE C ARM C-ARMOUR

## (undated) DEVICE — DRAPE TOWEL BLUE 17" X 24"

## (undated) DEVICE — VISITEC 4X4